# Patient Record
Sex: FEMALE | Race: WHITE | NOT HISPANIC OR LATINO | Employment: FULL TIME | URBAN - METROPOLITAN AREA
[De-identification: names, ages, dates, MRNs, and addresses within clinical notes are randomized per-mention and may not be internally consistent; named-entity substitution may affect disease eponyms.]

---

## 2019-05-14 ENCOUNTER — OFFICE VISIT (OUTPATIENT)
Dept: OBSTETRICS AND GYNECOLOGY | Facility: CLINIC | Age: 34
End: 2019-05-14
Attending: OBSTETRICS & GYNECOLOGY
Payer: COMMERCIAL

## 2019-05-14 VITALS
HEIGHT: 68 IN | BODY MASS INDEX: 25.07 KG/M2 | WEIGHT: 165.44 LBS | DIASTOLIC BLOOD PRESSURE: 80 MMHG | SYSTOLIC BLOOD PRESSURE: 112 MMHG

## 2019-05-14 DIAGNOSIS — Z12.4 ENCOUNTER FOR PAPANICOLAOU SMEAR FOR CERVICAL CANCER SCREENING: Primary | ICD-10-CM

## 2019-05-14 DIAGNOSIS — N93.8 DUB (DYSFUNCTIONAL UTERINE BLEEDING): ICD-10-CM

## 2019-05-14 DIAGNOSIS — Z11.51 ENCOUNTER FOR SCREENING FOR HUMAN PAPILLOMAVIRUS (HPV): ICD-10-CM

## 2019-05-14 DIAGNOSIS — Z13.21 ENCOUNTER FOR VITAMIN DEFICIENCY SCREENING: ICD-10-CM

## 2019-05-14 DIAGNOSIS — Z00.00 PERIODIC HEALTH ASSESSMENT, GENERAL SCREENING, ADULT: ICD-10-CM

## 2019-05-14 DIAGNOSIS — L68.0 HIRSUTISM: ICD-10-CM

## 2019-05-14 PROCEDURE — 87624 HPV HI-RISK TYP POOLED RSLT: CPT

## 2019-05-14 PROCEDURE — 99999 PR PBB SHADOW E&M-NEW PATIENT-LVL III: CPT | Mod: PBBFAC,,, | Performed by: OBSTETRICS & GYNECOLOGY

## 2019-05-14 PROCEDURE — 99999 PR PBB SHADOW E&M-NEW PATIENT-LVL III: ICD-10-PCS | Mod: PBBFAC,,, | Performed by: OBSTETRICS & GYNECOLOGY

## 2019-05-14 PROCEDURE — 88175 CYTOPATH C/V AUTO FLUID REDO: CPT

## 2019-05-14 PROCEDURE — 99385 PREV VISIT NEW AGE 18-39: CPT | Mod: S$GLB,,, | Performed by: OBSTETRICS & GYNECOLOGY

## 2019-05-14 PROCEDURE — 99385 PR PREVENTIVE VISIT,NEW,18-39: ICD-10-PCS | Mod: S$GLB,,, | Performed by: OBSTETRICS & GYNECOLOGY

## 2019-05-14 RX ORDER — FEXOFENADINE HCL 60 MG
60 TABLET ORAL DAILY
COMMUNITY
End: 2020-05-26

## 2019-05-14 NOTE — PROGRESS NOTES
Subjective:       Patient ID: Astrid Kaemmerling is a 34 y.o. female.    Chief Complaint:  Establish Care (trying to get pregnant for past 10 months , irregular periods had mirena, last pap maybe 2 years ago normal per pt')      History of Present Illness.  Astrid Kaemmerling is a 34 y.o. female.  She has no breast or urinary symptoms.  She has no menorrhagia, oligomenorrhea, bleeding betweeen menses, postcoital bleeding, dysmenorrhea, pelvic pain, dyspareunia, vaginal dryness, vaginal discharge, or sexual complaints.  Had Mirena taken out 2018.  Period -, 3/17-3/21, light spotting on .  Before Mirena, took OCP continuously.  No issues with acne or excess hair growth.  She has been trying for a year to conceive.    GYN & OB History  Patient's last menstrual period was 2019 (approximate).   Date of Last Pap: 2017 Normal per pt  Gardasil: No    OB History    Para Term  AB Living   0 0 0 0 0 0   SAB TAB Ectopic Multiple Live Births   0 0 0 0 0   Obstetric Comments   Menarche at 12       Past Medical History:   Diagnosis Date    Asthma      Past Surgical History:   Procedure Laterality Date    FOOT SURGERY       Family History   Problem Relation Age of Onset    Colon cancer Paternal Grandfather     Prostate cancer Paternal Grandfather         30's    Breast cancer Maternal Grandmother         70's    Stroke Maternal Grandmother     Heart attack Maternal Grandfather     Parkinsonism Paternal Grandmother     No Known Problems Father     No Known Problems Mother     No Known Problems Brother     No Known Problems Brother     Ovarian cancer Neg Hx      Social History     Tobacco Use    Smoking status: Never Smoker    Smokeless tobacco: Never Used   Substance Use Topics    Alcohol use: Yes     Comment: once a week    Drug use: Never       Current Outpatient Medications:     fexofenadine (ALLEGRA) 60 MG tablet, Take 60 mg by mouth once daily., Disp: , Rfl:     Review of  "patient's allergies indicates:   Allergen Reactions    Apple     Banana     Nuts [tree nut]        Review of Systems  Review of Systems   Constitutional: Negative for fatigue.   HENT: Negative for trouble swallowing.    Eyes: Negative for visual disturbance.   Respiratory: Negative for cough and shortness of breath.    Cardiovascular: Negative for chest pain.   Gastrointestinal: Negative for abdominal distention, abdominal pain, blood in stool, nausea and vomiting.   Genitourinary: Negative for difficulty urinating, dyspareunia, dysuria, flank pain, frequency, hematuria, pelvic pain, urgency, vaginal bleeding, vaginal discharge and vaginal pain.   Musculoskeletal: Negative for arthralgias.   Skin: Negative for rash.   Neurological: Negative for dizziness and headaches.   Psychiatric/Behavioral: Negative for sleep disturbance. The patient is not nervous/anxious.         Objective:     Vitals:    05/14/19 1123   BP: 112/80   Weight: 75 kg (165 lb 7.3 oz)   Height: 5' 7.72" (1.72 m)   PainSc: 0-No pain     Body mass index is 25.37 kg/m².      Physical Exam:   Constitutional: She is oriented to person, place, and time. Vital signs are normal. She appears well-developed and well-nourished.    HENT:   Head: Normocephalic.     Neck: Normal range of motion. No thyromegaly present.     Pulmonary/Chest: Right breast exhibits no mass, no nipple discharge, no skin change, no tenderness and no swelling. Left breast exhibits no mass, no nipple discharge, no skin change, no tenderness and no swelling. Breasts are symmetrical.       Hair middle of chest and around nipples        Abdominal: Soft. Normal appearance and bowel sounds are normal. She exhibits no distension. There is no tenderness.     Genitourinary: Vagina normal and uterus normal. Pelvic exam was performed with patient supine. There is no rash, tenderness, lesion or injury on the right labia. There is no rash, tenderness, lesion or injury on the left labia. Cervix " is normal. Right adnexum displays no mass, no tenderness and no fullness. Left adnexum displays no mass, no tenderness and no fullness. No erythema in the vagina. No vaginal discharge found. Cervix exhibits no motion tenderness and no discharge.           Musculoskeletal: Normal range of motion.      Lymphadenopathy:        Right: No inguinal and no supraclavicular adenopathy present.        Left: No inguinal and no supraclavicular adenopathy present.    Neurological: She is alert and oriented to person, place, and time.    Skin: Skin is warm and dry.    Psychiatric: She has a normal mood and affect.        Assessment/ Plan:     Encounter for Papanicolaou smear for cervical cancer screening  -     Liquid-based pap smear, screening    Encounter for screening for human papillomavirus (HPV)  -     HPV High Risk Genotypes, PCR    DUB (dysfunctional uterine bleeding)  -     Follicle stimulating hormone; Future  -     Luteinizing hormone; Future  -     TSH; Future  -     CBC auto differential; Future; Expected date: 05/14/2019  -     17-Hydroxyprogesterone; Future  -     Prolactin; Future  -     Testosterone, free; Future  -     Testosterone; Future  -     DHEA-sulfate; Future  -     US Pelvis Comp with Transvag NON-OB (xpd; Future; Expected date: 05/14/2019    Periodic health assessment, general screening, adult  -     CBC auto differential; Future; Expected date: 05/14/2019  -     Comprehensive metabolic panel; Future  -     Hemoglobin A1c; Future  -     Lipid panel; Future    Encounter for vitamin deficiency screening  -     Vitamin D; Future; Expected date: 05/14/2019      U/S for DUB - suspect PCOS  Routine pap smears.  Self breast exam  Diet and exercise discussed.  Follow-up with me in 2-3 weeks after labs.

## 2019-05-17 LAB
HPV HR 12 DNA CVX QL NAA+PROBE: NEGATIVE
HPV16 AG SPEC QL: NEGATIVE
HPV18 DNA SPEC QL NAA+PROBE: NEGATIVE

## 2019-05-23 ENCOUNTER — LAB VISIT (OUTPATIENT)
Dept: LAB | Facility: OTHER | Age: 34
End: 2019-05-23
Attending: OBSTETRICS & GYNECOLOGY
Payer: COMMERCIAL

## 2019-05-23 DIAGNOSIS — Z00.00 PERIODIC HEALTH ASSESSMENT, GENERAL SCREENING, ADULT: ICD-10-CM

## 2019-05-23 DIAGNOSIS — N93.8 DUB (DYSFUNCTIONAL UTERINE BLEEDING): ICD-10-CM

## 2019-05-23 DIAGNOSIS — Z13.21 ENCOUNTER FOR VITAMIN DEFICIENCY SCREENING: ICD-10-CM

## 2019-05-23 LAB
25(OH)D3+25(OH)D2 SERPL-MCNC: 19 NG/ML (ref 30–96)
ALBUMIN SERPL BCP-MCNC: 4.3 G/DL (ref 3.5–5.2)
ALP SERPL-CCNC: 37 U/L (ref 55–135)
ALT SERPL W/O P-5'-P-CCNC: 22 U/L (ref 10–44)
ANION GAP SERPL CALC-SCNC: 8 MMOL/L (ref 8–16)
AST SERPL-CCNC: 21 U/L (ref 10–40)
BASOPHILS # BLD AUTO: 0.02 K/UL (ref 0–0.2)
BASOPHILS NFR BLD: 0.6 % (ref 0–1.9)
BILIRUB SERPL-MCNC: 0.7 MG/DL (ref 0.1–1)
BUN SERPL-MCNC: 11 MG/DL (ref 6–20)
CALCIUM SERPL-MCNC: 9.2 MG/DL (ref 8.7–10.5)
CHLORIDE SERPL-SCNC: 106 MMOL/L (ref 95–110)
CHOLEST SERPL-MCNC: 193 MG/DL (ref 120–199)
CHOLEST/HDLC SERPL: 2.6 {RATIO} (ref 2–5)
CO2 SERPL-SCNC: 24 MMOL/L (ref 23–29)
CREAT SERPL-MCNC: 0.7 MG/DL (ref 0.5–1.4)
DHEA-S SERPL-MCNC: 156.5 UG/DL (ref 74.8–410.2)
DIFFERENTIAL METHOD: ABNORMAL
EOSINOPHIL # BLD AUTO: 0.2 K/UL (ref 0–0.5)
EOSINOPHIL NFR BLD: 5.3 % (ref 0–8)
ERYTHROCYTE [DISTWIDTH] IN BLOOD BY AUTOMATED COUNT: 12.4 % (ref 11.5–14.5)
EST. GFR  (AFRICAN AMERICAN): >60 ML/MIN/1.73 M^2
EST. GFR  (NON AFRICAN AMERICAN): >60 ML/MIN/1.73 M^2
ESTIMATED AVG GLUCOSE: 91 MG/DL (ref 68–131)
FSH SERPL-ACNC: 29.3 MIU/ML
GLUCOSE SERPL-MCNC: 79 MG/DL (ref 70–110)
HBA1C MFR BLD HPLC: 4.8 % (ref 4–5.6)
HCT VFR BLD AUTO: 37.4 % (ref 37–48.5)
HDLC SERPL-MCNC: 74 MG/DL (ref 40–75)
HDLC SERPL: 38.3 % (ref 20–50)
HGB BLD-MCNC: 12.9 G/DL (ref 12–16)
LDLC SERPL CALC-MCNC: 110 MG/DL (ref 63–159)
LH SERPL-ACNC: 17.8 MIU/ML
LYMPHOCYTES # BLD AUTO: 1.5 K/UL (ref 1–4.8)
LYMPHOCYTES NFR BLD: 41.6 % (ref 18–48)
MCH RBC QN AUTO: 30.6 PG (ref 27–31)
MCHC RBC AUTO-ENTMCNC: 34.5 G/DL (ref 32–36)
MCV RBC AUTO: 89 FL (ref 82–98)
MONOCYTES # BLD AUTO: 0.3 K/UL (ref 0.3–1)
MONOCYTES NFR BLD: 6.9 % (ref 4–15)
NEUTROPHILS # BLD AUTO: 1.7 K/UL (ref 1.8–7.7)
NEUTROPHILS NFR BLD: 45.6 % (ref 38–73)
NONHDLC SERPL-MCNC: 119 MG/DL
PLATELET # BLD AUTO: 214 K/UL (ref 150–350)
PMV BLD AUTO: 9.8 FL (ref 9.2–12.9)
POTASSIUM SERPL-SCNC: 4.1 MMOL/L (ref 3.5–5.1)
PROLACTIN SERPL IA-MCNC: 21.6 NG/ML (ref 5.2–26.5)
PROT SERPL-MCNC: 7.2 G/DL (ref 6–8.4)
RBC # BLD AUTO: 4.21 M/UL (ref 4–5.4)
SODIUM SERPL-SCNC: 138 MMOL/L (ref 136–145)
TESTOST SERPL-MCNC: 19 NG/DL (ref 5–73)
TRIGL SERPL-MCNC: 45 MG/DL (ref 30–150)
TSH SERPL DL<=0.005 MIU/L-ACNC: 1.15 UIU/ML (ref 0.4–4)
WBC # BLD AUTO: 3.61 K/UL (ref 3.9–12.7)

## 2019-05-23 PROCEDURE — 83498 ASY HYDROXYPROGESTERONE 17-D: CPT

## 2019-05-23 PROCEDURE — 82627 DEHYDROEPIANDROSTERONE: CPT

## 2019-05-23 PROCEDURE — 83002 ASSAY OF GONADOTROPIN (LH): CPT

## 2019-05-23 PROCEDURE — 83001 ASSAY OF GONADOTROPIN (FSH): CPT

## 2019-05-23 PROCEDURE — 83036 HEMOGLOBIN GLYCOSYLATED A1C: CPT

## 2019-05-23 PROCEDURE — 84443 ASSAY THYROID STIM HORMONE: CPT

## 2019-05-23 PROCEDURE — 80061 LIPID PANEL: CPT

## 2019-05-23 PROCEDURE — 84402 ASSAY OF FREE TESTOSTERONE: CPT

## 2019-05-23 PROCEDURE — 82306 VITAMIN D 25 HYDROXY: CPT

## 2019-05-23 PROCEDURE — 84146 ASSAY OF PROLACTIN: CPT

## 2019-05-23 PROCEDURE — 80053 COMPREHEN METABOLIC PANEL: CPT

## 2019-05-23 PROCEDURE — 85025 COMPLETE CBC W/AUTO DIFF WBC: CPT

## 2019-05-23 PROCEDURE — 36415 COLL VENOUS BLD VENIPUNCTURE: CPT

## 2019-05-23 PROCEDURE — 84403 ASSAY OF TOTAL TESTOSTERONE: CPT

## 2019-05-28 LAB — 17OHP SERPL-MCNC: 65 NG/DL (ref 35–413)

## 2019-05-29 LAB — TESTOST FREE SERPL-MCNC: 1.1 PG/ML

## 2019-07-08 ENCOUNTER — LAB VISIT (OUTPATIENT)
Dept: LAB | Facility: OTHER | Age: 34
End: 2019-07-08
Attending: OBSTETRICS & GYNECOLOGY
Payer: COMMERCIAL

## 2019-07-08 ENCOUNTER — OFFICE VISIT (OUTPATIENT)
Dept: OBSTETRICS AND GYNECOLOGY | Facility: CLINIC | Age: 34
End: 2019-07-08
Attending: OBSTETRICS & GYNECOLOGY
Payer: COMMERCIAL

## 2019-07-08 ENCOUNTER — PATIENT MESSAGE (OUTPATIENT)
Dept: OBSTETRICS AND GYNECOLOGY | Facility: CLINIC | Age: 34
End: 2019-07-08

## 2019-07-08 VITALS
SYSTOLIC BLOOD PRESSURE: 108 MMHG | WEIGHT: 164.13 LBS | HEIGHT: 68 IN | BODY MASS INDEX: 24.88 KG/M2 | DIASTOLIC BLOOD PRESSURE: 64 MMHG

## 2019-07-08 DIAGNOSIS — N93.8 DUB (DYSFUNCTIONAL UTERINE BLEEDING): Primary | ICD-10-CM

## 2019-07-08 DIAGNOSIS — N93.8 DUB (DYSFUNCTIONAL UTERINE BLEEDING): ICD-10-CM

## 2019-07-08 PROCEDURE — 99214 PR OFFICE/OUTPT VISIT, EST, LEVL IV, 30-39 MIN: ICD-10-PCS | Mod: S$GLB,,, | Performed by: OBSTETRICS & GYNECOLOGY

## 2019-07-08 PROCEDURE — 99999 PR PBB SHADOW E&M-EST. PATIENT-LVL III: ICD-10-PCS | Mod: PBBFAC,,, | Performed by: OBSTETRICS & GYNECOLOGY

## 2019-07-08 PROCEDURE — 83520 IMMUNOASSAY QUANT NOS NONAB: CPT

## 2019-07-08 PROCEDURE — 99214 OFFICE O/P EST MOD 30 MIN: CPT | Mod: S$GLB,,, | Performed by: OBSTETRICS & GYNECOLOGY

## 2019-07-08 PROCEDURE — 99999 PR PBB SHADOW E&M-EST. PATIENT-LVL III: CPT | Mod: PBBFAC,,, | Performed by: OBSTETRICS & GYNECOLOGY

## 2019-07-08 PROCEDURE — 3008F PR BODY MASS INDEX (BMI) DOCUMENTED: ICD-10-PCS | Mod: CPTII,S$GLB,, | Performed by: OBSTETRICS & GYNECOLOGY

## 2019-07-08 PROCEDURE — 3008F BODY MASS INDEX DOCD: CPT | Mod: CPTII,S$GLB,, | Performed by: OBSTETRICS & GYNECOLOGY

## 2019-07-08 PROCEDURE — 36415 COLL VENOUS BLD VENIPUNCTURE: CPT

## 2019-07-08 NOTE — PROGRESS NOTES
Astrid Kaemmerling is a 34 y.o. female who presents for follow up of DUB and trying to conceive.  Had Mirena taken out 4/2018.  Period 2/8-2/12, 3/17-3/21, light spotting on 5/8.  Before Mirena, took OCP continuously.  No issues with acne or excess hair growth.  She has been trying for a year to conceive.  On physical exam, she had hair on middle of chest and around the nipples.  Today on U/S, she does not PCOS appearing ovaries with an endometrial stripe of 10 mm.  Labs are basically normal except FSH is 29 and Vitamin D is 19.  Period ended 12/20 and then was 8 weeks until next period on 2/8-2/12.  Periods as follows:  March 17-21, May 2-8, June 1-5, and July 1-4.  Unsure when mother went through menopause.  No sleep issues.  Maybe occasional hot flashes.  2 cousins had issues conceiving possibly due to POF.  She thinks they both had to see DIANNA.    Lab Visit on 05/23/2019   Component Date Value Ref Range Status    Follicle Stimulating Hormone 05/23/2019 29.30  See Text mIU/mL Final    LH 05/23/2019 17.8  See Text mIU/mL Final    TSH 05/23/2019 1.147  0.400 - 4.000 uIU/mL Final    WBC 05/23/2019 3.61* 3.90 - 12.70 K/uL Final    RBC 05/23/2019 4.21  4.00 - 5.40 M/uL Final    Hemoglobin 05/23/2019 12.9  12.0 - 16.0 g/dL Final    Hematocrit 05/23/2019 37.4  37.0 - 48.5 % Final    Mean Corpuscular Volume 05/23/2019 89  82 - 98 fL Final    Mean Corpuscular Hemoglobin 05/23/2019 30.6  27.0 - 31.0 pg Final    Mean Corpuscular Hemoglobin Conc 05/23/2019 34.5  32.0 - 36.0 g/dL Final    RDW 05/23/2019 12.4  11.5 - 14.5 % Final    Platelets 05/23/2019 214  150 - 350 K/uL Final    MPV 05/23/2019 9.8  9.2 - 12.9 fL Final    Gran # (ANC) 05/23/2019 1.7* 1.8 - 7.7 K/uL Final    Lymph # 05/23/2019 1.5  1.0 - 4.8 K/uL Final    Mono # 05/23/2019 0.3  0.3 - 1.0 K/uL Final    Eos # 05/23/2019 0.2  0.0 - 0.5 K/uL Final    Baso # 05/23/2019 0.02  0.00 - 0.20 K/uL Final    Gran% 05/23/2019 45.6  38.0 - 73.0 % Final     Lymph% 05/23/2019 41.6  18.0 - 48.0 % Final    Mono% 05/23/2019 6.9  4.0 - 15.0 % Final    Eosinophil% 05/23/2019 5.3  0.0 - 8.0 % Final    Basophil% 05/23/2019 0.6  0.0 - 1.9 % Final    Differential Method 05/23/2019 Automated   Final    Sodium 05/23/2019 138  136 - 145 mmol/L Final    Potassium 05/23/2019 4.1  3.5 - 5.1 mmol/L Final    Chloride 05/23/2019 106  95 - 110 mmol/L Final    CO2 05/23/2019 24  23 - 29 mmol/L Final    Glucose 05/23/2019 79  70 - 110 mg/dL Final    BUN, Bld 05/23/2019 11  6 - 20 mg/dL Final    Creatinine 05/23/2019 0.7  0.5 - 1.4 mg/dL Final    Calcium 05/23/2019 9.2  8.7 - 10.5 mg/dL Final    Total Protein 05/23/2019 7.2  6.0 - 8.4 g/dL Final    Albumin 05/23/2019 4.3  3.5 - 5.2 g/dL Final    Total Bilirubin 05/23/2019 0.7  0.1 - 1.0 mg/dL Final    Alkaline Phosphatase 05/23/2019 37* 55 - 135 U/L Final    AST 05/23/2019 21  10 - 40 U/L Final    ALT 05/23/2019 22  10 - 44 U/L Final    Anion Gap 05/23/2019 8  8 - 16 mmol/L Final    eGFR if African American 05/23/2019 >60  >60 mL/min/1.73 m^2 Final    eGFR if non African American 05/23/2019 >60  >60 mL/min/1.73 m^2 Final    Hemoglobin A1C 05/23/2019 4.8  4.0 - 5.6 % Final    Estimated Avg Glucose 05/23/2019 91  68 - 131 mg/dL Final    Cholesterol 05/23/2019 193  120 - 199 mg/dL Final    Triglycerides 05/23/2019 45  30 - 150 mg/dL Final    HDL 05/23/2019 74  40 - 75 mg/dL Final    LDL Cholesterol 05/23/2019 110.0  63.0 - 159.0 mg/dL Final    Hdl/Cholesterol Ratio 05/23/2019 38.3  20.0 - 50.0 % Final    Total Cholesterol/HDL Ratio 05/23/2019 2.6  2.0 - 5.0 Final    Non-HDL Cholesterol 05/23/2019 119  mg/dL Final    17-Hydroxyprogesterone 05/23/2019 65  35 - 413 ng/dL Final    Prolactin 05/23/2019 21.6  5.2 - 26.5 ng/mL Final    Testosterone, Free 05/23/2019 1.1  pg/mL Final    Testosterone, Total 05/23/2019 19  5 - 73 ng/dL Final    DHEA-SO4 05/23/2019 156.5  74.8 - 410.2 ug/dL Final    Vit D, 25-Hydroxy  2019 19* 30 - 96 ng/mL Final   Office Visit on 2019   Component Date Value Ref Range Status    HPV High Risk type 16, PCR 2019 Negative  Negative Final    HPV High Risk type 18, PCR 2019 Negative  Negative Final    HPV other High Risk types, PCR 2019 Negative  Negative Final       Menstrual History:  OB History        0    Para   0    Term   0       0    AB   0    Living   0       SAB   0    TAB   0    Ectopic   0    Multiple   0    Live Births   0           Obstetric Comments   Menarche at 12            Patient's last menstrual period was 2019 (exact date).       Past Medical History:   Diagnosis Date    Asthma      Past Surgical History:   Procedure Laterality Date    FOOT SURGERY       Social History     Tobacco Use    Smoking status: Never Smoker    Smokeless tobacco: Never Used   Substance Use Topics    Alcohol use: Yes     Comment: once a week    Drug use: Never     Family History   Problem Relation Age of Onset    Colon cancer Paternal Grandfather     Prostate cancer Paternal Grandfather         30's    Breast cancer Maternal Grandmother         70's    Stroke Maternal Grandmother     Heart attack Maternal Grandfather     Parkinsonism Paternal Grandmother     No Known Problems Father     No Known Problems Mother     No Known Problems Brother     No Known Problems Brother     Ovarian cancer Neg Hx      OB History    Para Term  AB Living   0 0 0 0 0 0   SAB TAB Ectopic Multiple Live Births   0 0 0 0 0   Obstetric Comments   Menarche at 12       Review of Systems:  General: No fever, chills, fatigue or weight loss.  Chest: No chest pain, shortness of breath, or palpitations.  Breast: No pain, masses, or nipple discharge.  Vulva: No pain, lesions, or itching.  Vagina: No relaxation, pain, itching, discharge, or lesions.  Abdomen: No pain, nausea, vomiting, diarrhea, or constipation.  Urinary: No incontinence, nocturia,  "frequency, or dysuria.  Extremities:  No leg cramps, edema, or calf pain.  Neurologic: No headaches, dizziness, or visual changes.     Objective:     Vitals:    07/08/19 1120   BP: 108/64   Weight: 74.5 kg (164 lb 2.1 oz)   Height: 5' 7.72" (1.72 m)   PainSc: 0-No pain     Body mass index is 25.16 kg/m².    Assessment & Plan   DUB (dysfunctional uterine bleeding)  -     Antimullerian hormone (AMH); Future      Discussed with patient this could be POI or possibly PCOS - picture not completely clear although the FSH being 29 and her possible family h/o POI in 2 cousins who got pregant through assted reproduction makes me think more POI.  Continue nature made vitamin D3 5000 IU daily  Refer to Dr. Sheets or Lidia.  I spent 25 minutes face to face with the patient today.  Follow-up after seeing DIANNA.    "

## 2019-07-10 LAB — MIS SERPL-MCNC: <0.1 NG/ML (ref 0.9–9.5)

## 2019-08-23 ENCOUNTER — OFFICE VISIT (OUTPATIENT)
Dept: OBSTETRICS AND GYNECOLOGY | Facility: CLINIC | Age: 34
End: 2019-08-23
Payer: COMMERCIAL

## 2019-08-23 VITALS
HEIGHT: 68 IN | SYSTOLIC BLOOD PRESSURE: 120 MMHG | DIASTOLIC BLOOD PRESSURE: 80 MMHG | BODY MASS INDEX: 24.88 KG/M2 | WEIGHT: 164.13 LBS

## 2019-08-23 DIAGNOSIS — N92.6 MISSED MENSES: Primary | ICD-10-CM

## 2019-08-23 DIAGNOSIS — Z32.01 POSITIVE URINE PREGNANCY TEST: ICD-10-CM

## 2019-08-23 PROCEDURE — 99213 OFFICE O/P EST LOW 20 MIN: CPT | Mod: S$GLB,,, | Performed by: NURSE PRACTITIONER

## 2019-08-23 PROCEDURE — 99999 PR PBB SHADOW E&M-EST. PATIENT-LVL III: CPT | Mod: PBBFAC,,, | Performed by: NURSE PRACTITIONER

## 2019-08-23 PROCEDURE — 3008F BODY MASS INDEX DOCD: CPT | Mod: CPTII,S$GLB,, | Performed by: NURSE PRACTITIONER

## 2019-08-23 PROCEDURE — 99213 PR OFFICE/OUTPT VISIT, EST, LEVL III, 20-29 MIN: ICD-10-PCS | Mod: S$GLB,,, | Performed by: NURSE PRACTITIONER

## 2019-08-23 PROCEDURE — 99999 PR PBB SHADOW E&M-EST. PATIENT-LVL III: ICD-10-PCS | Mod: PBBFAC,,, | Performed by: NURSE PRACTITIONER

## 2019-08-23 PROCEDURE — 3008F PR BODY MASS INDEX (BMI) DOCUMENTED: ICD-10-PCS | Mod: CPTII,S$GLB,, | Performed by: NURSE PRACTITIONER

## 2019-08-23 NOTE — PATIENT INSTRUCTIONS
Nausea and vomiting in pregnancy:    -  Education regarding lifestyle and dietary modifications    -  Advised use of B6/Unisom. Pt will notify us if no relief/worsening symptoms, will consider Zofran if needed.  (To help with nausea and vomiting, 25mg of Vitamin B6 taken 3-4 times a day along with 12.5 mg of Unisom taken 3-4 times a day helps. Unisom only comes in 25mg tabs, so you will have to cut those in half. These are the same ingredients that are in the prescription versions!  Anything tamanna will help, along with small frequent meals instead of larger less frequent meals help. Stay hydrated.)

## 2019-08-23 NOTE — PROGRESS NOTES
"Chief Complaint: Absence of Menses     (Dr. Gandhi patient)    Patient's last menstrual period was 2019 (exact date).,   EDC: 2020,   GA:  7w 4d,  based upon LMP.      Astrid Kaemmerling is a 34 y.o. female  presents with complaint of absence of menstruation and (+) home UPT on 19.  States her menstrual cycles have been irregular, sometimes even skipping months.  She had appt set to see fertility specialist, and found out the day before with home UPT that she was pregnant. She reports nausea; denies vomiting.  Denies vaginal bleeding or abdominal pain.    UPT is: positive.     Last Pap:  2019     Past Medical History:   Diagnosis Date    Asthma      Past Surgical History:   Procedure Laterality Date    FOOT SURGERY       Social History     Tobacco Use    Smoking status: Never Smoker    Smokeless tobacco: Never Used   Substance Use Topics    Alcohol use: Yes     Comment: once a week    Drug use: Never     Family History   Problem Relation Age of Onset    Colon cancer Paternal Grandfather     Prostate cancer Paternal Grandfather         30's    Breast cancer Maternal Grandmother         70's    Stroke Maternal Grandmother     Heart attack Maternal Grandfather     Parkinsonism Paternal Grandmother     No Known Problems Father     No Known Problems Mother     No Known Problems Brother     No Known Problems Brother     Ovarian cancer Neg Hx      OB History    Para Term  AB Living   0 0 0 0 0 0   SAB TAB Ectopic Multiple Live Births   0 0 0 0 0   Obstetric Comments   Menarche at 12       /80   Ht 5' 8" (1.727 m)   Wt 74.5 kg (164 lb 2.1 oz)   LMP 2019 (Exact Date)   BMI 24.96 kg/m²   Body mass index is 24.96 kg/m².     ROS:  GENERAL: No weight changes. No swelling. No fatigue. No fever.  CARDIOVASCULAR: No chest pain. No shortness of breath. No leg cramps.   NEUROLOGICAL: No headaches. No vision changes.  BREASTS: No pain. No lumps. No " discharge.  ABDOMEN: No pain. No diarrhea. No constipation.  REPRODUCTIVE: No abnormal bleeding.   VULVA: No pain. No lesions. No itching.  VAGINA: No relaxation. No itching. No odor. No discharge. No lesions.  URINARY: No incontinence. No nocturia. No frequency. No dysuria.    MEDICATIONS AND ALLERGIES:  Reviewed     PE:   APPEARANCE: Well nourished, well developed, in no acute distress.  AFFECT: WNL, alert and oriented x 3.  NECK: Neck symmetric without masses or thyromegaly.   CHEST: Good respiratory effort.     Nausea and vomiting in pregnancy:    -  Education regarding lifestyle and dietary modifications    -  Advised use of B6/Unisom. Pt will notify us if no relief/worsening symptoms, will consider Zofran if needed.  (To help with nausea and vomiting, 25mg of Vitamin B6 taken 3-4 times a day along with 12.5 mg of Unisom taken 3-4 times a day helps. Unisom only comes in 25mg tabs, so you will have to cut those in half. These are the same ingredients that are in the prescription versions!  Anything tamanna will help, along with small frequent meals instead of larger less frequent meals help. Stay hydrated.)     1st TRIMESTER COUNSELING: Discussed and packet provided:  * Common complaints of pregnancy  * HIV and other routine prenatal tests including  genetic screening  * Risk factors identified by prenatal history;  Nutrition and weight gain counseling  * Oriented to practice: discussed anticipated course of prenatal care  * Indications for Ultrasound  * Childbirth classes/Hospital facilities   * Toxoplasmosis precautions (Cats/Raw Meat);  Foods to avoid in pregnancy (i.e. sushi, fish high in mercury, deli meat, and unpasteurized cheeses)  * Sexual activity and exercise; Caffeine consumption (<300 mg/day)  * Environmental/Work hazards; Travel (including Zika Precautions)  * Tobacco, alcohol, and drug use  * Use of any medications (Including supplements, Vitamins, Herbs, or OTC Drugs)  * Domestic  violence; Seat belt use & not texting while driving    *  Connected Moms-- to be discussed per MD at Initial OB visit.    ASSESSMENT and PLAN:  Missed menses  -     POCT urine pregnancy  -     US OB/GYN Procedure ([facility]) - Extended List - Future; Future    Positive urine pregnancy test        Follow up in about 1 week (around 8/30/2019) for F/U with physician for Initial OB visit & U/S.    ~25 minutes spent with pt Face to Face with >50% of visit spent on education/counseling.

## 2019-08-28 ENCOUNTER — PROCEDURE VISIT (OUTPATIENT)
Dept: OBSTETRICS AND GYNECOLOGY | Facility: CLINIC | Age: 34
End: 2019-08-28
Payer: COMMERCIAL

## 2019-08-28 ENCOUNTER — LAB VISIT (OUTPATIENT)
Dept: LAB | Facility: HOSPITAL | Age: 34
End: 2019-08-28
Attending: OBSTETRICS & GYNECOLOGY
Payer: COMMERCIAL

## 2019-08-28 ENCOUNTER — INITIAL PRENATAL (OUTPATIENT)
Dept: OBSTETRICS AND GYNECOLOGY | Facility: CLINIC | Age: 34
End: 2019-08-28
Payer: COMMERCIAL

## 2019-08-28 VITALS
DIASTOLIC BLOOD PRESSURE: 60 MMHG | WEIGHT: 162.25 LBS | SYSTOLIC BLOOD PRESSURE: 108 MMHG | BODY MASS INDEX: 24.67 KG/M2

## 2019-08-28 DIAGNOSIS — N92.6 MISSED MENSES: ICD-10-CM

## 2019-08-28 DIAGNOSIS — Z34.90 PREGNANCY, UNSPECIFIED GESTATIONAL AGE: ICD-10-CM

## 2019-08-28 DIAGNOSIS — Z34.90 PREGNANCY, UNSPECIFIED GESTATIONAL AGE: Primary | ICD-10-CM

## 2019-08-28 DIAGNOSIS — Z36.89 ESTABLISH GESTATIONAL AGE, ULTRASOUND: ICD-10-CM

## 2019-08-28 DIAGNOSIS — O36.80X0 ENCOUNTER TO DETERMINE FETAL VIABILITY OF PREGNANCY, SINGLE OR UNSPECIFIED FETUS: ICD-10-CM

## 2019-08-28 LAB
BASOPHILS # BLD AUTO: 0.05 K/UL (ref 0–0.2)
BASOPHILS NFR BLD: 0.6 % (ref 0–1.9)
DIFFERENTIAL METHOD: ABNORMAL
EOSINOPHIL # BLD AUTO: 0.3 K/UL (ref 0–0.5)
EOSINOPHIL NFR BLD: 3.8 % (ref 0–8)
ERYTHROCYTE [DISTWIDTH] IN BLOOD BY AUTOMATED COUNT: 11.9 % (ref 11.5–14.5)
GLUCOSE SERPL-MCNC: 61 MG/DL (ref 70–110)
HCT VFR BLD AUTO: 36 % (ref 37–48.5)
HGB BLD-MCNC: 12.3 G/DL (ref 12–16)
IMM GRANULOCYTES # BLD AUTO: 0.02 K/UL (ref 0–0.04)
IMM GRANULOCYTES NFR BLD AUTO: 0.2 % (ref 0–0.5)
LYMPHOCYTES # BLD AUTO: 1.8 K/UL (ref 1–4.8)
LYMPHOCYTES NFR BLD: 21.2 % (ref 18–48)
MCH RBC QN AUTO: 31.1 PG (ref 27–31)
MCHC RBC AUTO-ENTMCNC: 34.2 G/DL (ref 32–36)
MCV RBC AUTO: 91 FL (ref 82–98)
MONOCYTES # BLD AUTO: 0.5 K/UL (ref 0.3–1)
MONOCYTES NFR BLD: 6.3 % (ref 4–15)
NEUTROPHILS # BLD AUTO: 5.8 K/UL (ref 1.8–7.7)
NEUTROPHILS NFR BLD: 67.9 % (ref 38–73)
NRBC BLD-RTO: 0 /100 WBC
PLATELET # BLD AUTO: 225 K/UL (ref 150–350)
PMV BLD AUTO: 9.7 FL (ref 9.2–12.9)
RBC # BLD AUTO: 3.96 M/UL (ref 4–5.4)
TSH SERPL DL<=0.005 MIU/L-ACNC: 1.31 UIU/ML (ref 0.4–4)
WBC # BLD AUTO: 8.46 K/UL (ref 3.9–12.7)

## 2019-08-28 PROCEDURE — 99999 PR PBB SHADOW E&M-EST. PATIENT-LVL II: CPT | Mod: PBBFAC,,, | Performed by: OBSTETRICS & GYNECOLOGY

## 2019-08-28 PROCEDURE — 86592 SYPHILIS TEST NON-TREP QUAL: CPT

## 2019-08-28 PROCEDURE — 0502F SUBSEQUENT PRENATAL CARE: CPT | Mod: CPTII,S$GLB,, | Performed by: OBSTETRICS & GYNECOLOGY

## 2019-08-28 PROCEDURE — 87086 URINE CULTURE/COLONY COUNT: CPT

## 2019-08-28 PROCEDURE — 86762 RUBELLA ANTIBODY: CPT

## 2019-08-28 PROCEDURE — 87491 CHLMYD TRACH DNA AMP PROBE: CPT

## 2019-08-28 PROCEDURE — 76801 OB US < 14 WKS SINGLE FETUS: CPT | Mod: S$GLB,,, | Performed by: OBSTETRICS & GYNECOLOGY

## 2019-08-28 PROCEDURE — 84144 ASSAY OF PROGESTERONE: CPT

## 2019-08-28 PROCEDURE — 99999 PR PBB SHADOW E&M-EST. PATIENT-LVL II: ICD-10-PCS | Mod: PBBFAC,,, | Performed by: OBSTETRICS & GYNECOLOGY

## 2019-08-28 PROCEDURE — 85025 COMPLETE CBC W/AUTO DIFF WBC: CPT

## 2019-08-28 PROCEDURE — 82947 ASSAY GLUCOSE BLOOD QUANT: CPT

## 2019-08-28 PROCEDURE — 76801 PR US, OB <14WKS, TRANSABD, SINGLE GESTATION: ICD-10-PCS | Mod: S$GLB,,, | Performed by: OBSTETRICS & GYNECOLOGY

## 2019-08-28 PROCEDURE — 86901 BLOOD TYPING SEROLOGIC RH(D): CPT

## 2019-08-28 PROCEDURE — 87340 HEPATITIS B SURFACE AG IA: CPT

## 2019-08-28 PROCEDURE — 86703 HIV-1/HIV-2 1 RESULT ANTBDY: CPT

## 2019-08-28 PROCEDURE — 84443 ASSAY THYROID STIM HORMONE: CPT

## 2019-08-28 PROCEDURE — 0502F PR SUBSEQUENT PRENATAL CARE: ICD-10-PCS | Mod: CPTII,S$GLB,, | Performed by: OBSTETRICS & GYNECOLOGY

## 2019-08-28 NOTE — PROGRESS NOTES
Obstetrical ultrasound completed today.  See report in imaging section of [facility].

## 2019-08-29 DIAGNOSIS — O98.911: Primary | ICD-10-CM

## 2019-08-29 DIAGNOSIS — R79.89 LOW SERUM PROGESTERONE: ICD-10-CM

## 2019-08-29 LAB
ABO + RH BLD: NORMAL
BACTERIA UR CULT: NORMAL
C TRACH DNA SPEC QL NAA+PROBE: NOT DETECTED
HBV SURFACE AG SERPL QL IA: NEGATIVE
HIV 1+2 AB+HIV1 P24 AG SERPL QL IA: NEGATIVE
N GONORRHOEA DNA SPEC QL NAA+PROBE: NOT DETECTED
PROGEST SERPL-MCNC: 14.2 NG/ML
RPR SER QL: NORMAL
RUBV IGG SER-ACNC: 68.8 IU/ML
RUBV IGG SER-IMP: REACTIVE

## 2019-08-29 RX ORDER — PROGESTERONE 100 MG/1
100 CAPSULE ORAL DAILY
Qty: 30 CAPSULE | Refills: 1 | Status: SHIPPED | OUTPATIENT
Start: 2019-08-29 | End: 2020-01-02

## 2019-09-05 ENCOUNTER — PATIENT MESSAGE (OUTPATIENT)
Dept: OBSTETRICS AND GYNECOLOGY | Facility: CLINIC | Age: 34
End: 2019-09-05

## 2019-09-05 NOTE — TELEPHONE ENCOUNTER
Swing 7w2d ob pt c/o nausea and vomiting. Advised pt to stay hydrated and drink 10-20 8oz glasses per day and to eat bland food. Asked pt if she would like a prescription sent in to help. Her reply came in two parts both are below:      Thank you for your fast reply.     I am not taking anything against nausea/vomiting but can say that I am definitely not hitting 10-12 8 oz glasses per day.     I think it's max. 6-8 and not all actually makes it into the system due to vomiting. I feel dehydrated and at a loss.     I would appreciate a prescription of pregnancy safe medication. I thought it would get better. Thank you.     Sarah   addition: I am also having a very hard time taking all the supplements particularly the prenatal vitamins, they make me vomit.

## 2019-09-06 RX ORDER — ONDANSETRON 4 MG/1
4 TABLET, FILM COATED ORAL EVERY 6 HOURS PRN
Qty: 30 TABLET | Refills: 0 | Status: SHIPPED | OUTPATIENT
Start: 2019-09-06 | End: 2020-04-07

## 2019-09-09 ENCOUNTER — PATIENT MESSAGE (OUTPATIENT)
Dept: OBSTETRICS AND GYNECOLOGY | Facility: CLINIC | Age: 34
End: 2019-09-09

## 2019-09-11 ENCOUNTER — PROCEDURE VISIT (OUTPATIENT)
Dept: OBSTETRICS AND GYNECOLOGY | Facility: CLINIC | Age: 34
End: 2019-09-11
Payer: COMMERCIAL

## 2019-09-11 ENCOUNTER — ROUTINE PRENATAL (OUTPATIENT)
Dept: OBSTETRICS AND GYNECOLOGY | Facility: CLINIC | Age: 34
End: 2019-09-11
Payer: COMMERCIAL

## 2019-09-11 VITALS — SYSTOLIC BLOOD PRESSURE: 102 MMHG | DIASTOLIC BLOOD PRESSURE: 62 MMHG | BODY MASS INDEX: 24.6 KG/M2 | WEIGHT: 161.81 LBS

## 2019-09-11 DIAGNOSIS — O09.511 AMA (ADVANCED MATERNAL AGE) PRIMIGRAVIDA 35+, FIRST TRIMESTER: ICD-10-CM

## 2019-09-11 DIAGNOSIS — Z36.89 ESTABLISH GESTATIONAL AGE, ULTRASOUND: ICD-10-CM

## 2019-09-11 DIAGNOSIS — Z3A.08 8 WEEKS GESTATION OF PREGNANCY: Primary | ICD-10-CM

## 2019-09-11 DIAGNOSIS — Z34.90 PREGNANCY, UNSPECIFIED GESTATIONAL AGE: ICD-10-CM

## 2019-09-11 PROCEDURE — 99999 PR PBB SHADOW E&M-EST. PATIENT-LVL III: CPT | Mod: PBBFAC,,, | Performed by: NURSE PRACTITIONER

## 2019-09-11 PROCEDURE — 0502F SUBSEQUENT PRENATAL CARE: CPT | Mod: CPTII,S$GLB,, | Performed by: NURSE PRACTITIONER

## 2019-09-11 PROCEDURE — 99999 PR PBB SHADOW E&M-EST. PATIENT-LVL III: ICD-10-PCS | Mod: PBBFAC,,, | Performed by: NURSE PRACTITIONER

## 2019-09-11 PROCEDURE — 76801 OB US < 14 WKS SINGLE FETUS: CPT | Mod: S$GLB,,, | Performed by: OBSTETRICS & GYNECOLOGY

## 2019-09-11 PROCEDURE — 0502F PR SUBSEQUENT PRENATAL CARE: ICD-10-PCS | Mod: CPTII,S$GLB,, | Performed by: NURSE PRACTITIONER

## 2019-09-11 PROCEDURE — 76801 PR US, OB <14WKS, TRANSABD, SINGLE GESTATION: ICD-10-PCS | Mod: S$GLB,,, | Performed by: OBSTETRICS & GYNECOLOGY

## 2019-09-11 NOTE — PROGRESS NOTES
Obstetrical ultrasound completed today.  See report in imaging section of [facility].

## 2019-09-11 NOTE — PROGRESS NOTES
"Here today for follow-up ultrasound from initial OB u/s to confirm dating and GA.  No change in GA/EDC.    EDC: 04/21/2020  GA:  8w 1d  Reviewed findings with pt and :  "A flaherty living IUP is present. Fetal size is consistent with established dating."  Bleeding/cramping prec given.  Patient will call [employer] to get the needed auth for [facility], as they told pt they will cover the test.  She would like both [facility] and [facility] done.  "

## 2019-09-18 ENCOUNTER — TELEPHONE (OUTPATIENT)
Dept: OBSTETRICS AND GYNECOLOGY | Facility: CLINIC | Age: 34
End: 2019-09-18

## 2019-09-18 NOTE — TELEPHONE ENCOUNTER
Dr. Gandhi pt called saying that she would like to talk about the M21 test and she needs an authorization. Please advise,

## 2019-09-18 NOTE — TELEPHONE ENCOUNTER
Spoke with [id number] and they informed me that pt will do test first and then if PA is needed they will send me the information to finalize it. No PA is needed in advance. Informed pt of this. She will do [id number] and Inheritest at her next visit.

## 2019-10-07 ENCOUNTER — LAB VISIT (OUTPATIENT)
Dept: LAB | Facility: OTHER | Age: 34
End: 2019-10-07
Attending: OBSTETRICS & GYNECOLOGY
Payer: COMMERCIAL

## 2019-10-07 ENCOUNTER — ROUTINE PRENATAL (OUTPATIENT)
Dept: OBSTETRICS AND GYNECOLOGY | Facility: CLINIC | Age: 34
End: 2019-10-07
Attending: OBSTETRICS & GYNECOLOGY
Payer: COMMERCIAL

## 2019-10-07 VITALS
DIASTOLIC BLOOD PRESSURE: 72 MMHG | SYSTOLIC BLOOD PRESSURE: 114 MMHG | WEIGHT: 166.56 LBS | BODY MASS INDEX: 25.32 KG/M2

## 2019-10-07 DIAGNOSIS — Z3A.11 11 WEEKS GESTATION OF PREGNANCY: ICD-10-CM

## 2019-10-07 DIAGNOSIS — Z3A.11 11 WEEKS GESTATION OF PREGNANCY: Primary | ICD-10-CM

## 2019-10-07 DIAGNOSIS — E55.9 VITAMIN D DEFICIENCY: ICD-10-CM

## 2019-10-07 DIAGNOSIS — Z34.01 ENCOUNTER FOR SUPERVISION OF NORMAL FIRST PREGNANCY IN FIRST TRIMESTER: ICD-10-CM

## 2019-10-07 LAB — 25(OH)D3+25(OH)D2 SERPL-MCNC: 42 NG/ML (ref 30–96)

## 2019-10-07 PROCEDURE — 99999 PR PBB SHADOW E&M-EST. PATIENT-LVL II: ICD-10-PCS | Mod: PBBFAC,,, | Performed by: OBSTETRICS & GYNECOLOGY

## 2019-10-07 PROCEDURE — 36415 COLL VENOUS BLD VENIPUNCTURE: CPT

## 2019-10-07 PROCEDURE — 82306 VITAMIN D 25 HYDROXY: CPT

## 2019-10-07 PROCEDURE — 0502F SUBSEQUENT PRENATAL CARE: CPT | Mod: CPTII,S$GLB,, | Performed by: OBSTETRICS & GYNECOLOGY

## 2019-10-07 PROCEDURE — 0502F PR SUBSEQUENT PRENATAL CARE: ICD-10-PCS | Mod: CPTII,S$GLB,, | Performed by: OBSTETRICS & GYNECOLOGY

## 2019-10-07 PROCEDURE — 99999 PR PBB SHADOW E&M-EST. PATIENT-LVL II: CPT | Mod: PBBFAC,,, | Performed by: OBSTETRICS & GYNECOLOGY

## 2019-10-07 NOTE — PROGRESS NOTES
Vomits daily.  PNV makes her nauseated.  I sent zofran but she doesn't like it due to drowsiness.  Declines Diclegis

## 2019-10-16 ENCOUNTER — PATIENT MESSAGE (OUTPATIENT)
Dept: OBSTETRICS AND GYNECOLOGY | Facility: CLINIC | Age: 34
End: 2019-10-16

## 2019-11-01 ENCOUNTER — TELEPHONE (OUTPATIENT)
Dept: OBSTETRICS AND GYNECOLOGY | Facility: CLINIC | Age: 34
End: 2019-11-01

## 2019-11-01 DIAGNOSIS — O09.511 AMA (ADVANCED MATERNAL AGE) PRIMIGRAVIDA 35+, FIRST TRIMESTER: Primary | ICD-10-CM

## 2019-11-01 NOTE — TELEPHONE ENCOUNTER
----- Message from Akosua Gandhi MD sent at 10/30/2019  4:05 PM CDT -----  Please schedule [facility] consult for this patient ASAP  ----- Message -----  From: Jeanie Fuentes MD  Sent: 10/29/2019   5:29 PM CDT  To: Akosua Gandhi MD    Yes, I would be more than happy to see her.  And thank you for your foresight on the FOB.    Jeanie    ----- Message -----  From: Akosua Gandhi MD  Sent: 10/29/2019   2:42 PM CDT  To: Jeanie Fuentes MD    Hi - This patient has premature ovarian insufficiency and is now 15 weeks pregnant.  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  Can I send her to you for a consult?  Also, I will order testing on .

## 2019-11-04 ENCOUNTER — TELEPHONE (OUTPATIENT)
Dept: MATERNAL FETAL MEDICINE | Facility: CLINIC | Age: 34
End: 2019-11-04

## 2019-11-04 NOTE — TELEPHONE ENCOUNTER
Message left for pt to call [facility] clinic at [phone] to schedule consult appointment.

## 2019-11-06 ENCOUNTER — ROUTINE PRENATAL (OUTPATIENT)
Dept: OBSTETRICS AND GYNECOLOGY | Facility: CLINIC | Age: 34
End: 2019-11-06
Payer: COMMERCIAL

## 2019-11-06 ENCOUNTER — PATIENT MESSAGE (OUTPATIENT)
Dept: OBSTETRICS AND GYNECOLOGY | Facility: CLINIC | Age: 34
End: 2019-11-06

## 2019-11-06 VITALS
SYSTOLIC BLOOD PRESSURE: 120 MMHG | WEIGHT: 169.06 LBS | DIASTOLIC BLOOD PRESSURE: 80 MMHG | BODY MASS INDEX: 25.71 KG/M2

## 2019-11-06 DIAGNOSIS — Z3A.16 16 WEEKS GESTATION OF PREGNANCY: Primary | ICD-10-CM

## 2019-11-06 DIAGNOSIS — Z34.02 ENCOUNTER FOR SUPERVISION OF NORMAL FIRST PREGNANCY IN SECOND TRIMESTER: ICD-10-CM

## 2019-11-06 PROCEDURE — 0502F PR SUBSEQUENT PRENATAL CARE: ICD-10-PCS | Mod: CPTII,S$GLB,, | Performed by: OBSTETRICS & GYNECOLOGY

## 2019-11-06 PROCEDURE — 0502F SUBSEQUENT PRENATAL CARE: CPT | Mod: CPTII,S$GLB,, | Performed by: OBSTETRICS & GYNECOLOGY

## 2019-11-06 PROCEDURE — 99999 PR PBB SHADOW E&M-EST. PATIENT-LVL II: CPT | Mod: PBBFAC,,, | Performed by: OBSTETRICS & GYNECOLOGY

## 2019-11-06 PROCEDURE — 99999 PR PBB SHADOW E&M-EST. PATIENT-LVL II: ICD-10-PCS | Mod: PBBFAC,,, | Performed by: OBSTETRICS & GYNECOLOGY

## 2019-11-08 ENCOUNTER — INITIAL CONSULT (OUTPATIENT)
Dept: MATERNAL FETAL MEDICINE | Facility: CLINIC | Age: 34
End: 2019-11-08
Payer: COMMERCIAL

## 2019-11-08 VITALS — BODY MASS INDEX: 25.91 KG/M2 | SYSTOLIC BLOOD PRESSURE: 98 MMHG | WEIGHT: 170.44 LBS | DIASTOLIC BLOOD PRESSURE: 50 MMHG

## 2019-11-08 DIAGNOSIS — Z14.8 GENETIC CARRIER: Primary | ICD-10-CM

## 2019-11-08 PROCEDURE — 3008F PR BODY MASS INDEX (BMI) DOCUMENTED: ICD-10-PCS | Mod: CPTII,S$GLB,, | Performed by: PEDIATRICS

## 2019-11-08 PROCEDURE — 99205 PR OFFICE/OUTPT VISIT, NEW, LEVL V, 60-74 MIN: ICD-10-PCS | Mod: S$GLB,,, | Performed by: PEDIATRICS

## 2019-11-08 PROCEDURE — 99205 OFFICE O/P NEW HI 60 MIN: CPT | Mod: S$GLB,,, | Performed by: PEDIATRICS

## 2019-11-08 PROCEDURE — 99999 PR PBB SHADOW E&M-EST. PATIENT-LVL III: CPT | Mod: PBBFAC,,, | Performed by: PEDIATRICS

## 2019-11-08 PROCEDURE — 99999 PR PBB SHADOW E&M-EST. PATIENT-LVL III: ICD-10-PCS | Mod: PBBFAC,,, | Performed by: PEDIATRICS

## 2019-11-08 PROCEDURE — 3008F BODY MASS INDEX DOCD: CPT | Mod: CPTII,S$GLB,, | Performed by: PEDIATRICS

## 2019-11-08 NOTE — LETTER
November 14, 2019      Akosua Gandhi MD  [address]  [address]  [facility] [address]           [facility] [address]  [address] [facility] [address]  Phone: [phone]          Patient: Astrid Kaemmerling   MR Number: [id number]   YOB: 1985   Date of Visit: 11/8/2019       Dear Dr. Akosua Gandhi:    Thank you for referring Astrid Kaemmerling to me for evaluation. Attached you will find relevant portions of my assessment and plan of care.    If you have questions, please do not hesitate to call me. I look forward to following Astrid Kaemmerling along with you.    Sincerely,    Jeanie Fuentes MD    Enclosure  CC:  No Recipients    If you would like to receive this communication electronically, please contact [email or web address] or [phone] to request more information on [employer] access.    For providers and/or their staff who would like to refer a patient to [facility], please contact us through our one-stop-shop provider referral line, [facility], at [phone].    If you feel you have received this communication in error or would no longer like to receive these types of communications, please e-mail [email or web address]

## 2019-11-11 ENCOUNTER — LAB VISIT (OUTPATIENT)
Dept: LAB | Facility: OTHER | Age: 34
End: 2019-11-11
Attending: PEDIATRICS
Payer: COMMERCIAL

## 2019-11-11 ENCOUNTER — INITIAL CONSULT (OUTPATIENT)
Dept: MATERNAL FETAL MEDICINE | Facility: CLINIC | Age: 34
End: 2019-11-11
Payer: COMMERCIAL

## 2019-11-11 ENCOUNTER — TELEPHONE (OUTPATIENT)
Dept: MATERNAL FETAL MEDICINE | Facility: CLINIC | Age: 34
End: 2019-11-11

## 2019-11-11 ENCOUNTER — PROCEDURE VISIT (OUTPATIENT)
Dept: MATERNAL FETAL MEDICINE | Facility: CLINIC | Age: 34
End: 2019-11-11
Payer: COMMERCIAL

## 2019-11-11 VITALS
WEIGHT: 172.38 LBS | BODY MASS INDEX: 26.21 KG/M2 | DIASTOLIC BLOOD PRESSURE: 76 MMHG | SYSTOLIC BLOOD PRESSURE: 112 MMHG

## 2019-11-11 DIAGNOSIS — Z14.8 TAY-SACHS CARRIER: ICD-10-CM

## 2019-11-11 DIAGNOSIS — Z13.79 ENCOUNTER FOR GENETIC SCREENING: Primary | ICD-10-CM

## 2019-11-11 DIAGNOSIS — Z14.8 GENETIC CARRIER: ICD-10-CM

## 2019-11-11 DIAGNOSIS — Z14.8 CARRIER OF FRAGILE X CHROMOSOME: ICD-10-CM

## 2019-11-11 LAB — MISCELLANEOUS TEST NAME: NORMAL

## 2019-11-11 PROCEDURE — 3008F PR BODY MASS INDEX (BMI) DOCUMENTED: ICD-10-PCS | Mod: CPTII,S$GLB,, | Performed by: PEDIATRICS

## 2019-11-11 PROCEDURE — 59000 PR AMNIOCENTESIS,DIAGNOSTIC: ICD-10-PCS | Mod: S$GLB,,, | Performed by: PEDIATRICS

## 2019-11-11 PROCEDURE — 3008F BODY MASS INDEX DOCD: CPT | Mod: CPTII,S$GLB,, | Performed by: PEDIATRICS

## 2019-11-11 PROCEDURE — 36415 COLL VENOUS BLD VENIPUNCTURE: CPT

## 2019-11-11 PROCEDURE — 76946 ECHO GUIDE FOR AMNIOCENTESIS: CPT | Mod: S$GLB,,, | Performed by: PEDIATRICS

## 2019-11-11 PROCEDURE — 99999 PR PBB SHADOW E&M-EST. PATIENT-LVL III: ICD-10-PCS | Mod: PBBFAC,,, | Performed by: PEDIATRICS

## 2019-11-11 PROCEDURE — 59000 AMNIOCENTESIS DIAGNOSTIC: CPT | Mod: S$GLB,,, | Performed by: PEDIATRICS

## 2019-11-11 PROCEDURE — 99999 PR PBB SHADOW E&M-EST. PATIENT-LVL III: CPT | Mod: PBBFAC,,, | Performed by: PEDIATRICS

## 2019-11-11 PROCEDURE — 76946 PR  SO2 GUIDE AMNIOCENTESIS: ICD-10-PCS | Mod: S$GLB,,, | Performed by: PEDIATRICS

## 2019-11-11 PROCEDURE — 99213 OFFICE O/P EST LOW 20 MIN: CPT | Mod: 25,S$GLB,, | Performed by: PEDIATRICS

## 2019-11-11 PROCEDURE — 99213 PR OFFICE/OUTPT VISIT, EST, LEVL III, 20-29 MIN: ICD-10-PCS | Mod: 25,S$GLB,, | Performed by: PEDIATRICS

## 2019-11-11 NOTE — TELEPHONE ENCOUNTER
Message left for genetic counselor to call [facility] clinic at [phone].     9:43 am= Yue Burnham, genetic counselor with [employer] returning call. Informed her that Amniocentesis was done this am on pt. Per Dr Fuentes 23 mls was collected. She wants to ensure the PRIORITY lab is still Fragile X as reported in the previous phone call on Friday. Additional labs ordered are Chromosome Analysis and [facility] Full sequencing. If enough of sample collected is available without interfering with the priority testing, [facility] (FISH) can be done as ordered. Yue reports based on the volume of 23 mls she will not be able to do a direct on fragile X but it can be cultured. FISH will be able to be processed because it requires such a tiny amount amount of sample to process. Yue wanted Dr Fuentes to NOT worry when Chromosome Analysis reports first. She can expected the Fragile X result in about 3 weeks.

## 2019-11-11 NOTE — PROGRESS NOTES
Indication  ========    Amniocentesis. Karyotyping    History  ======    General History  Blood group: A  Rhesus: Rh positive  Risk Factors  Details: Fagile X Carrier  Details: Fadi-Sachs Carrier  History risk factors: AMA  Details: Asthma    Pregnancy History  ==============    Maternal Lab Tests  Test: Materni T21  Result of other maternal screening test: Negative    Maternal Assessment  =================    Weight 78 kg  Weight (lb) 172 lb  BP syst 112 mmHg  BP diast 76 mmHg    Method  ======    Transabdominal ultrasound examination, Voluson E10. View: Good view    Pregnancy  =========    Mejias pregnancy. Number of fetuses: 1    Dating  ======    LMP on: 7/1/2019  Cycle: irregular cycle  GA by LMP 19 w + 0 d  CHETAN by LMP: 4/6/2020  Assigned: based on ultrasound (CRL), selected on 08/28/2019  Assigned GA 16 w + 6 d  Assigned CHETAN: 4/21/2020  Pregnancy length 280 d    General Evaluation  ==============    Cardiac activity present.  Placenta posterior.  Amniotic fluid normal amount.    Amniocentesis  ============    Start 8:33 AM. End 8:55 AM  Instrument: TA 21G needle. Insertion site: lower abdomen mid. Method: transamniotic. Entries uterus: 1  Sample: obtained. Sample amount 27 ml. Sample quality: clear yellow  Sample identification confirmed    The patient was counseled about the risks of the procedure. The patient signed consents for the amniocentesis. A timeout was performed in  order to correctly identify patient information.    Amniocentesis was performed under direct ultrasound visualization with the single insertion of a 22 gauge needle. Approximately 24 ml of clear  amniotic fluid were removed and sent for FISH, fetal karyotype/microarray, and AFP.    The patient tolerated the procedure well. There were normal fetal heart tones after completion of the procedure.    The patient's blood type is A+. No Rhogam necessary.    The patient was given instructions regarding activity after the amniocentesis and was  counseled to inform us of symptoms such as leakage of  amniotic fluid, vaginal bleeding, significant cramping or fever following the procedure.      Evaluation  ========    Post cardiac activity: present, normal. FHR post 151 bpm    Rhesus Prophylaxis  ===============    Rh positive    Procedure  ========    See above.    Consultation  ==========    Reviewed testing for Fragile X and Fadi Sachs. Couple has agreed to Karyotype also. We will proceed with further testing as needed from  results.  Discussed procedure, risks/benefits and possible complications.      I spent 15 minutes in patient care management and consultation with greater than 50% face to face.      Impression  =========    Amniocentesis.      Recommendation  ==============    AF sent to [facility] for Fragile X testing, Fadi Sachs, karyotype and FISH if enough AF.  Patient sent for maternal sample.

## 2019-11-14 ENCOUNTER — TELEPHONE (OUTPATIENT)
Dept: MATERNAL FETAL MEDICINE | Facility: CLINIC | Age: 34
End: 2019-11-14

## 2019-11-14 NOTE — TELEPHONE ENCOUNTER
InSight Analysis result received via fax.     InSight Analysis result:   NO EVIDENCE OF NUMERICAL ABNORMALITY for chromosomes 13, 18, 21, X and Y.   Sex: MALE     Interpretation:  NORMAL INSIGHT RESULT       Message left for patient to call [facility] clinic at [phone] to review results.

## 2019-11-15 ENCOUNTER — TELEPHONE (OUTPATIENT)
Dept: MATERNAL FETAL MEDICINE | Facility: CLINIC | Age: 34
End: 2019-11-15

## 2019-11-15 NOTE — TELEPHONE ENCOUNTER
Patient has been notified of Amniocentesis Insight Analysis results:    No evidence of numerical abnormality for chromosomes 13, 18, 21 X and Y.    Sex: Male    Additional labs pending at this time for Fragile X and Fadi Sachs as well as a chromosome analysis.    Pt verbalized understanding of information.

## 2019-11-15 NOTE — PROGRESS NOTES
"Late Epic entry.  Transferred from [employer] Word.    Astrid Kaemmerling, a 34-year-old  1 at 16 weeks 3 days, presents for consultation regarding Inheritest results.  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  Paternal status is unknown.  There is no family history on the paternal side.  The patient is Cym[place].   is not of Eastern  or ashkenazi Mormonism heritage.      We discussed fragile X syndrome and how it is transmitted-see below.  I explained the biochemical impact of fragile X mental retardation 1 gene mutation and CGG repeats.  The couple has done a lot of reading it was an easy discussion because they already understand a good deal about the transmission and import of CGG repeats.  We discussed normal, intermediate, pre mutation and full mutation repeat size ranges.  The patient has had infertility issues and has been seen by the reproductive endocrine group.  Pregnancy was spontaneous, but her pre mutation status may indicate some level of ovarian insufficiency.    Fragile X syndrome is an X-linked disorder and the most common inherited cause of intellectual disability. Both males and females can be affected. The fragile X mental retardation 1 gene (FMR1), which produces fragile X MR protein, is located on the X chromosome at Xq27.3. The number of eoscliwa-yepnhqd-womhxfs (CGG) trinucleotide repeats in FMR1 have a "normal" range of about 5 to 44 CGG repeats. When there are ?44 CGG repeats, an adequate level of fragile X MFP is produced and is passed between generations without alteration.  An increase in CGG repeats within FMR1 gene may cause Fragile X syndrome.  Expansion of CGG repeats allows hypermethylation of FMR1, resulting in impaired transcription and reduced production of FMRP, which adversely impacts prenatal and  brain development.  As " repeat size increases, stability decreases, and further increases in the number of repeats in the FMR1 region become likely. The lower and upper boundaries of repeat length are usually noted as:    ?Normal -- 5 to 44 CGG repeats  ?Intermediate expansion -- 45 to 54 CGG repeats  ?Premutation -- 55 to 200 CGG repeats   ?Full mutation -- >200 CGG repeats    Prenatal diagnosis is available when a parent has >55 CGG repeats.  The degree of risk in offspring depends on the absolute CGG repeat size, the number and dispersion of AGG repeats, whether mother or father is the carrier, and the sex of the offspring.  Prenatal diagnosis is usually only performed when carrier is maternal as paternal sons will not inherit an X chromosome from carrier and daughters will be premutation carriers (CGG expansion does not occur in sperm).    The presence of Fadi-Sachs and the fetus would require paternal passage of an abnormal or mutation gene.  Given heritage, this is unlikely.  However the parents do desire fetal testing for fragile X, so paternal testing for either is unnecessary at this point.    I discussed amniocentesis as modality for testing.  The parents understand that there is a procedure risk of 1 in a [id number] for loss and possible complications were discussed.  They would like to proceed with amniocentesis for fetal Fadi-Sachs and fragile X testing with karyotype and FISH since patient is also AMA            RECOMMENDATIONS:    1.  Limited ultrasound anatomy with amniocentesis scheduled for 3 days hence.    2.  Amniocentesis for fetal fragile X and Fadi-Sachs testing and FISH/karyotype.      3. Anatomic survey at 18-20 weeks.  Follow-up for completion as necessary.    4.  Growth for AMA at 32 weeks.    5.  Other follow-up and counseling as dictated by results from testing.                    I spent 60 min in patient care management and counseling with greater than 50% face-to-face.

## 2019-11-21 ENCOUNTER — TELEPHONE (OUTPATIENT)
Dept: MATERNAL FETAL MEDICINE | Facility: CLINIC | Age: 34
End: 2019-11-21

## 2019-11-21 NOTE — TELEPHONE ENCOUNTER
Chromosome Analysis result received via fax. Report reviewed by Dr Fuentes.     Chromosome Analysis result:   46, XY  Male karyotype    Interpretation:  This analysis shows no evidence of clinically significant numerical or structural chromosome abnormalities.     Called to notify pt. Message left for patient to call [facility] clinic at [phone] for Chromosme analysis results.        10:10 am- Pt returning call. Informed of result as documented above. Confirmed we are awaiting results on fragile X and Tach Sachs full sequencing. Pt verbalized understanding of information.

## 2019-11-26 ENCOUNTER — TELEPHONE (OUTPATIENT)
Dept: MATERNAL FETAL MEDICINE | Facility: CLINIC | Age: 34
End: 2019-11-26

## 2019-11-26 ENCOUNTER — PATIENT MESSAGE (OUTPATIENT)
Dept: MATERNAL FETAL MEDICINE | Facility: CLINIC | Age: 34
End: 2019-11-26

## 2019-11-26 ENCOUNTER — PATIENT MESSAGE (OUTPATIENT)
Dept: OBSTETRICS AND GYNECOLOGY | Facility: CLINIC | Age: 34
End: 2019-11-26

## 2019-11-26 NOTE — TELEPHONE ENCOUNTER
1:24 pm: Spoke with Beck at [facility] who states that culturing the cells for Fragile X and Fadi Sachs testing takes 2 weeks. The cells should be finished with culturing on 11/27. At that point, they will be sent to the testing lab. With the Thanksgiving holiday, Beck states to allow for send out of cultured cells on 11/29 for receipt by testing lab on 11/30. Once the cultured cells are received at the testing lab, Fragile X takes 7-10 days to result and Fadi-Sachs takes 21 days to result.    1:29 pm: Call placed to Sarah and relayed all of above information. Patient verbalizes understanding.

## 2019-11-26 NOTE — TELEPHONE ENCOUNTER
LVM for [facility] counselor of the day at [phone] regarding timing of results on Fragile X and Fadi Sachs testing. Return phone number given.

## 2019-11-27 ENCOUNTER — TELEPHONE (OUTPATIENT)
Dept: OBSTETRICS AND GYNECOLOGY | Facility: CLINIC | Age: 34
End: 2019-11-27

## 2019-11-27 DIAGNOSIS — Z36.89 ENCOUNTER FOR FETAL ANATOMIC SURVEY: Primary | ICD-10-CM

## 2019-11-27 NOTE — TELEPHONE ENCOUNTER
Carmencita, RN with [facility], was calling to let us know that pt is has registered with the [facility] [employer] with her [facility] ins. Left contact number along with ext if MD or MA needs to reach her. Thank you.

## 2019-11-27 NOTE — TELEPHONE ENCOUNTER
Spoke with pt and let her know that [facility] contacted her on 11/4 to schedule her anatomy scan. Pt given the number to [facility] to schedule. tasha

## 2019-12-03 ENCOUNTER — PROCEDURE VISIT (OUTPATIENT)
Dept: MATERNAL FETAL MEDICINE | Facility: CLINIC | Age: 34
End: 2019-12-03
Payer: COMMERCIAL

## 2019-12-03 ENCOUNTER — INITIAL CONSULT (OUTPATIENT)
Dept: MATERNAL FETAL MEDICINE | Facility: CLINIC | Age: 34
End: 2019-12-03
Payer: COMMERCIAL

## 2019-12-03 VITALS
DIASTOLIC BLOOD PRESSURE: 66 MMHG | SYSTOLIC BLOOD PRESSURE: 108 MMHG | WEIGHT: 177.25 LBS | BODY MASS INDEX: 26.95 KG/M2

## 2019-12-03 DIAGNOSIS — Q99.2 FRAGILE X SYNDROME: Primary | ICD-10-CM

## 2019-12-03 DIAGNOSIS — Z36.89 ENCOUNTER FOR FETAL ANATOMIC SURVEY: ICD-10-CM

## 2019-12-03 PROCEDURE — 76811 PR US, OB FETAL EVAL & EXAM, TRANSABDOM,FIRST GESTATION: ICD-10-PCS | Mod: S$GLB,,, | Performed by: PEDIATRICS

## 2019-12-03 PROCEDURE — 76811 OB US DETAILED SNGL FETUS: CPT | Mod: S$GLB,,, | Performed by: PEDIATRICS

## 2019-12-03 PROCEDURE — 3008F PR BODY MASS INDEX (BMI) DOCUMENTED: ICD-10-PCS | Mod: CPTII,S$GLB,, | Performed by: PEDIATRICS

## 2019-12-03 PROCEDURE — 99999 PR PBB SHADOW E&M-EST. PATIENT-LVL II: CPT | Mod: PBBFAC,,, | Performed by: PEDIATRICS

## 2019-12-03 PROCEDURE — 3008F BODY MASS INDEX DOCD: CPT | Mod: CPTII,S$GLB,, | Performed by: PEDIATRICS

## 2019-12-03 PROCEDURE — 99999 PR PBB SHADOW E&M-EST. PATIENT-LVL II: ICD-10-PCS | Mod: PBBFAC,,, | Performed by: PEDIATRICS

## 2019-12-03 PROCEDURE — 99213 PR OFFICE/OUTPT VISIT, EST, LEVL III, 20-29 MIN: ICD-10-PCS | Mod: 25,S$GLB,, | Performed by: PEDIATRICS

## 2019-12-03 PROCEDURE — 99213 OFFICE O/P EST LOW 20 MIN: CPT | Mod: 25,S$GLB,, | Performed by: PEDIATRICS

## 2019-12-10 ENCOUNTER — TELEPHONE (OUTPATIENT)
Dept: MATERNAL FETAL MEDICINE | Facility: CLINIC | Age: 34
End: 2019-12-10

## 2019-12-10 NOTE — TELEPHONE ENCOUNTER
After review by Dr. Fuentes, the patient was notified of Fragile X Analysis PCR and Southern Blot:       This fetus is predicted to be unaffected with fragile   X Syndrome.    Fadi-Sachs result pending at this time and result expected the week of 12/23/19. Patient will be contacted as soon as results are received.    Pt verbalized understanding of information.

## 2019-12-10 NOTE — PROGRESS NOTES
Indication  ========    RT MD: Targeted Anatomy/Amnio FISH & Karyotype WNL: Taysachs & Fragile X in process    History  ======    General History  Blood group: A  Rhesus: Rh positive  Risk Factors  Details: Fagile X Carrier  Details: Fadi-Sachs Carrier  History risk factors: AMA  Details: Asthma    Pregnancy History  ==============    Maternal Lab Tests  Test: Materni T21  Result of other maternal screening test: Negative    Maternal Assessment  =================    Weight 80 kg  Weight (lb) 176 lb  BP syst 108 mmHg  BP diast 66 mmHg    Method  ======    Transabdominal ultrasound examination, 2D Color Doppler, Voluson E10, Transabdominal ultrasound examination. View: Good view    Pregnancy  =========    Mejias pregnancy. Number of fetuses: 1    Dating  ======    LMP on: 7/1/2019  Cycle: irregular cycle  GA by LMP 22 w + 1 d  CHETAN by LMP: 4/6/2020  Ultrasound examination on: 12/3/2019  GA by U/S based upon: AC, BPD, Femur, HC  GA by U/S 20 w + 5 d  CHETAN by U/S: 4/16/2020  Assigned: based on ultrasound (CRL), selected on 08/28/2019  Assigned GA 20 w + 0 d  Assigned CHETAN: 4/21/2020  Pregnancy length 280 d    General Evaluation  ==============    Cardiac activity present.  bpm.  Fetal movements visualized.  Presentation cephalic.  Placenta Placental site: posterior.  Umbilical cord Cord vessels: 3 vessel cord. Insertion site: normal insertion.  Amniotic fluid Amount of AF: normal amount.    Fetal Biometry  ============    Standard  BPD 49.4 mm  21w 0d                Hadlock    OFD 62.3 mm  21w 2d                Rama    .9 mm  20w 2d                Hadlock    Cerebellum tr 22.6 mm  21w 6d                Garcia    Nuchal fold 4.2 mm  .4 mm  21w 0d                Hadlock    Femur 33.8 mm  20w 4d                Hadlock    HC / AC 1.13     g          37%        Rivera    EFW (lb) 0 lb  EFW (oz) 13 oz  EFW by: Hadlock (BPD-HC-AC-FL)  Extended   4.9 mm  CM 5.3 mm    Head / Face /  Neck  Cephalic index 0.79    Extremities / Bony Struc  FL / BPD 0.68    FL / AC 0.21    Other Structures   bpm    Fetal Anatomy  ===========    Cranium: normal  Lateral ventricles: normal  Choroid plexus: normal  Midline falx: normal  Cavum septi pellucidi: normal  Cerebellum: normal  Cisterna magna: normal  Head / Neck  Vermis: normal  Neck: normal  Nuchal fold: normal  Lips: normal  Profile: normal  Nose: normal  RVOT view: normal  LVOT view: normal  Heart / Thorax  4-chamber view: 4-chamber normal, septum normal  Situs: situs solitus (normal)  Aortic arch view: normal  Ductal arch view: suboptimal  SVC: normal  IVC: normal  3-vessel view: normal  3-vessel-trachea view: normal  Rt lung: normal  Lt lung: normal  Diaphragm: normal  Cord insertion: normal  Stomach: normal  Kidneys: normal  Bladder: normal  Genitals: normal  Abdomen  Liver: normal  Bowel: normal  Rt renal artery: normal  Lt renal artery: normal  Cervical spine: normal  Thoracic spine: normal  Lumbar spine: normal  Sacral spine: normal  Spine: transverse views of spine appears wnl  Rt arm: normal  Lt arm: normal  Rt leg: normal  Lt leg: normal  Position of hands: normal  Position of feet: normal  Gender: male  Wants to know gender: yes    Maternal Structures  ===============    Uterus / Cervix  Uterus: Normal  Cervix: Visualized  Approach: Transabdominal  Cervical length 39.3 mm  Ovaries / Tubes / Adnexa  Rt ovary: Normal  Lt ovary: Normal          Consultation  ==========    Patient returns for anatomic survey and discussion of amniocentesis results. FISH and karyotype are WNL. However, testing for Fadi-Sachs  and Fragile X are not yet completed.    US results were reviewed and discussed with patient and . Awaiting the above results for further discussion.            I spent 15 minutes in patient care management and consultation with >50% face to face.            Impression  =========    A detailed fetal anatomic ultrasound examination  was performed today due to the following high risk indication: AMA, Fragile X    No fetal structural abnormalities are identified today (DA not well seen), and fetal size is appropriate for EGA.    Cervical length is normal.  Placental location is normal without evidence of previa.              Recommendation  ==============    US for growth and anatomy at 32 weeks'.  Will consult for Fragile X and Fadi Sachs when results are available.

## 2019-12-11 ENCOUNTER — TELEPHONE (OUTPATIENT)
Dept: MATERNAL FETAL MEDICINE | Facility: CLINIC | Age: 34
End: 2019-12-11

## 2019-12-11 ENCOUNTER — PATIENT MESSAGE (OUTPATIENT)
Dept: MATERNAL FETAL MEDICINE | Facility: CLINIC | Age: 34
End: 2019-12-11

## 2020-01-02 ENCOUNTER — ROUTINE PRENATAL (OUTPATIENT)
Dept: OBSTETRICS AND GYNECOLOGY | Facility: CLINIC | Age: 35
End: 2020-01-02
Payer: COMMERCIAL

## 2020-01-02 VITALS
WEIGHT: 183.63 LBS | SYSTOLIC BLOOD PRESSURE: 112 MMHG | BODY MASS INDEX: 27.92 KG/M2 | DIASTOLIC BLOOD PRESSURE: 68 MMHG

## 2020-01-02 DIAGNOSIS — Z3A.24 24 WEEKS GESTATION OF PREGNANCY: Primary | ICD-10-CM

## 2020-01-02 DIAGNOSIS — Z34.02 ENCOUNTER FOR SUPERVISION OF NORMAL FIRST PREGNANCY IN SECOND TRIMESTER: ICD-10-CM

## 2020-01-02 PROCEDURE — 99999 PR PBB SHADOW E&M-EST. PATIENT-LVL II: CPT | Mod: PBBFAC,,, | Performed by: OBSTETRICS & GYNECOLOGY

## 2020-01-02 PROCEDURE — 0502F PR SUBSEQUENT PRENATAL CARE: ICD-10-PCS | Mod: CPTII,S$GLB,, | Performed by: OBSTETRICS & GYNECOLOGY

## 2020-01-02 PROCEDURE — 99999 PR PBB SHADOW E&M-EST. PATIENT-LVL II: ICD-10-PCS | Mod: PBBFAC,,, | Performed by: OBSTETRICS & GYNECOLOGY

## 2020-01-02 PROCEDURE — 0502F SUBSEQUENT PRENATAL CARE: CPT | Mod: CPTII,S$GLB,, | Performed by: OBSTETRICS & GYNECOLOGY

## 2020-01-24 ENCOUNTER — TELEPHONE (OUTPATIENT)
Dept: OBSTETRICS AND GYNECOLOGY | Facility: CLINIC | Age: 35
End: 2020-01-24

## 2020-01-24 NOTE — TELEPHONE ENCOUNTER
Dr Gandhi pt calling, ob 27wks has asthma and needs a refill on medication. Pt went to her doctor to get this but was turn away cause she is pregnant. Pt wants to know what to do.Pt # [phone]

## 2020-01-24 NOTE — TELEPHONE ENCOUNTER
27 3/7 week OB pt states she tried to establish care with a PCP in order to get her asthma medication but she was turned away because she is pregnant.  She needs refills on [id number].  Med pended per pt.      Qvar pended

## 2020-01-27 DIAGNOSIS — J45.909 MILD ASTHMA, UNSPECIFIED WHETHER COMPLICATED, UNSPECIFIED WHETHER PERSISTENT: Primary | ICD-10-CM

## 2020-01-27 RX ORDER — ALBUTEROL SULFATE 90 UG/1
2 AEROSOL, METERED RESPIRATORY (INHALATION) EVERY 6 HOURS PRN
Qty: 18 G | Refills: 3 | Status: SHIPPED | OUTPATIENT
Start: 2020-01-27 | End: 2021-06-01

## 2020-02-06 ENCOUNTER — ROUTINE PRENATAL (OUTPATIENT)
Dept: OBSTETRICS AND GYNECOLOGY | Facility: CLINIC | Age: 35
End: 2020-02-06
Payer: COMMERCIAL

## 2020-02-06 ENCOUNTER — CLINICAL SUPPORT (OUTPATIENT)
Dept: OBSTETRICS AND GYNECOLOGY | Facility: CLINIC | Age: 35
End: 2020-02-06
Payer: COMMERCIAL

## 2020-02-06 VITALS
WEIGHT: 190.13 LBS | BODY MASS INDEX: 28.91 KG/M2 | SYSTOLIC BLOOD PRESSURE: 110 MMHG | DIASTOLIC BLOOD PRESSURE: 70 MMHG

## 2020-02-06 DIAGNOSIS — Z23 NEED FOR TDAP VACCINATION: ICD-10-CM

## 2020-02-06 DIAGNOSIS — Z3A.29 29 WEEKS GESTATION OF PREGNANCY: Primary | ICD-10-CM

## 2020-02-06 DIAGNOSIS — Z34.03 ENCOUNTER FOR SUPERVISION OF NORMAL FIRST PREGNANCY IN THIRD TRIMESTER: ICD-10-CM

## 2020-02-06 DIAGNOSIS — Z23 NEED FOR TDAP VACCINATION: Primary | ICD-10-CM

## 2020-02-06 PROCEDURE — 99999 PR PBB SHADOW E&M-EST. PATIENT-LVL I: CPT | Mod: PBBFAC,,,

## 2020-02-06 PROCEDURE — 0502F PR SUBSEQUENT PRENATAL CARE: ICD-10-PCS | Mod: CPTII,S$GLB,, | Performed by: OBSTETRICS & GYNECOLOGY

## 2020-02-06 PROCEDURE — 90471 TDAP VACCINE GREATER THAN OR EQUAL TO 7YO IM: ICD-10-PCS | Mod: S$GLB,,, | Performed by: OBSTETRICS & GYNECOLOGY

## 2020-02-06 PROCEDURE — 90471 IMMUNIZATION ADMIN: CPT | Mod: S$GLB,,, | Performed by: OBSTETRICS & GYNECOLOGY

## 2020-02-06 PROCEDURE — 0502F SUBSEQUENT PRENATAL CARE: CPT | Mod: CPTII,S$GLB,, | Performed by: OBSTETRICS & GYNECOLOGY

## 2020-02-06 PROCEDURE — 99999 PR PBB SHADOW E&M-EST. PATIENT-LVL I: ICD-10-PCS | Mod: PBBFAC,,,

## 2020-02-06 PROCEDURE — 90715 TDAP VACCINE GREATER THAN OR EQUAL TO 7YO IM: ICD-10-PCS | Mod: S$GLB,,, | Performed by: OBSTETRICS & GYNECOLOGY

## 2020-02-06 PROCEDURE — 90715 TDAP VACCINE 7 YRS/> IM: CPT | Mod: S$GLB,,, | Performed by: OBSTETRICS & GYNECOLOGY

## 2020-02-06 PROCEDURE — 99999 PR PBB SHADOW E&M-EST. PATIENT-LVL III: ICD-10-PCS | Mod: PBBFAC,,, | Performed by: OBSTETRICS & GYNECOLOGY

## 2020-02-06 PROCEDURE — 99999 PR PBB SHADOW E&M-EST. PATIENT-LVL III: CPT | Mod: PBBFAC,,, | Performed by: OBSTETRICS & GYNECOLOGY

## 2020-02-06 RX ORDER — CHOLECALCIFEROL (VITAMIN D3) 25 MCG
1000 TABLET ORAL DAILY
COMMUNITY

## 2020-02-06 RX ORDER — FOLIC ACID 0.8 MG
800 TABLET ORAL DAILY
COMMUNITY
End: 2020-09-01

## 2020-02-06 RX ORDER — PYRIDOXINE HCL (VITAMIN B6) 100 MG
100 TABLET ORAL DAILY
COMMUNITY
End: 2020-09-01

## 2020-02-06 RX ORDER — BECLOMETHASONE DIPROPIONATE HFA 80 UG/1
AEROSOL, METERED RESPIRATORY (INHALATION)
COMMUNITY
Start: 2020-01-18 | End: 2020-02-11

## 2020-02-06 NOTE — PROGRESS NOTES
She complains of constipation. Made some dietary changes and it's better.  Tdap  Reschedule 1 hgtt and CBC tomorrow morning

## 2020-02-06 NOTE — PROGRESS NOTES
Ordering Provider:      Patient here to receive  t dap to the LEFT  deltoid. Tolerated well, no reaction noted. Instructed to wait 15 minutes after administration for monitoring.      Pre pain scale: none     Post pain scale: none

## 2020-02-07 ENCOUNTER — LAB VISIT (OUTPATIENT)
Dept: LAB | Facility: HOSPITAL | Age: 35
End: 2020-02-07
Attending: OBSTETRICS & GYNECOLOGY
Payer: COMMERCIAL

## 2020-02-07 DIAGNOSIS — Z3A.24 24 WEEKS GESTATION OF PREGNANCY: ICD-10-CM

## 2020-02-07 LAB
BASOPHILS # BLD AUTO: 0.03 K/UL (ref 0–0.2)
BASOPHILS NFR BLD: 0.3 % (ref 0–1.9)
DIFFERENTIAL METHOD: ABNORMAL
EOSINOPHIL # BLD AUTO: 0.5 K/UL (ref 0–0.5)
EOSINOPHIL NFR BLD: 4.5 % (ref 0–8)
ERYTHROCYTE [DISTWIDTH] IN BLOOD BY AUTOMATED COUNT: 12.6 % (ref 11.5–14.5)
GLUCOSE SERPL-MCNC: 100 MG/DL (ref 70–140)
HCT VFR BLD AUTO: 32.8 % (ref 37–48.5)
HGB BLD-MCNC: 10.4 G/DL (ref 12–16)
IMM GRANULOCYTES # BLD AUTO: 0.07 K/UL (ref 0–0.04)
IMM GRANULOCYTES NFR BLD AUTO: 0.7 % (ref 0–0.5)
LYMPHOCYTES # BLD AUTO: 1.4 K/UL (ref 1–4.8)
LYMPHOCYTES NFR BLD: 14 % (ref 18–48)
MCH RBC QN AUTO: 30.6 PG (ref 27–31)
MCHC RBC AUTO-ENTMCNC: 31.7 G/DL (ref 32–36)
MCV RBC AUTO: 97 FL (ref 82–98)
MONOCYTES # BLD AUTO: 0.7 K/UL (ref 0.3–1)
MONOCYTES NFR BLD: 6.5 % (ref 4–15)
NEUTROPHILS # BLD AUTO: 7.6 K/UL (ref 1.8–7.7)
NEUTROPHILS NFR BLD: 74 % (ref 38–73)
NRBC BLD-RTO: 0 /100 WBC
PLATELET # BLD AUTO: 185 K/UL (ref 150–350)
PMV BLD AUTO: 10.2 FL (ref 9.2–12.9)
RBC # BLD AUTO: 3.4 M/UL (ref 4–5.4)
WBC # BLD AUTO: 10.22 K/UL (ref 3.9–12.7)

## 2020-02-07 PROCEDURE — 82950 GLUCOSE TEST: CPT

## 2020-02-07 PROCEDURE — 85025 COMPLETE CBC W/AUTO DIFF WBC: CPT

## 2020-02-10 ENCOUNTER — PATIENT MESSAGE (OUTPATIENT)
Dept: OBSTETRICS AND GYNECOLOGY | Facility: CLINIC | Age: 35
End: 2020-02-10

## 2020-02-11 DIAGNOSIS — O99.513 ASTHMA AFFECTING PREGNANCY IN THIRD TRIMESTER: Primary | ICD-10-CM

## 2020-02-11 DIAGNOSIS — O09.513 ADVANCED MATERNAL AGE, 1ST PREGNANCY, THIRD TRIMESTER: Primary | ICD-10-CM

## 2020-02-11 DIAGNOSIS — J45.909 ASTHMA AFFECTING PREGNANCY IN THIRD TRIMESTER: Primary | ICD-10-CM

## 2020-02-17 ENCOUNTER — PATIENT MESSAGE (OUTPATIENT)
Dept: OBSTETRICS AND GYNECOLOGY | Facility: CLINIC | Age: 35
End: 2020-02-17

## 2020-02-19 ENCOUNTER — PATIENT MESSAGE (OUTPATIENT)
Dept: OBSTETRICS AND GYNECOLOGY | Facility: CLINIC | Age: 35
End: 2020-02-19

## 2020-02-19 ENCOUNTER — ROUTINE PRENATAL (OUTPATIENT)
Dept: OBSTETRICS AND GYNECOLOGY | Facility: CLINIC | Age: 35
End: 2020-02-19
Payer: COMMERCIAL

## 2020-02-19 VITALS
WEIGHT: 190.94 LBS | DIASTOLIC BLOOD PRESSURE: 60 MMHG | BODY MASS INDEX: 29.03 KG/M2 | SYSTOLIC BLOOD PRESSURE: 110 MMHG

## 2020-02-19 DIAGNOSIS — Z34.03 ENCOUNTER FOR SUPERVISION OF NORMAL FIRST PREGNANCY IN THIRD TRIMESTER: ICD-10-CM

## 2020-02-19 DIAGNOSIS — Z3A.31 31 WEEKS GESTATION OF PREGNANCY: Primary | ICD-10-CM

## 2020-02-19 PROCEDURE — 0502F PR SUBSEQUENT PRENATAL CARE: ICD-10-PCS | Mod: CPTII,S$GLB,, | Performed by: OBSTETRICS & GYNECOLOGY

## 2020-02-19 PROCEDURE — 0502F SUBSEQUENT PRENATAL CARE: CPT | Mod: CPTII,S$GLB,, | Performed by: OBSTETRICS & GYNECOLOGY

## 2020-02-19 PROCEDURE — 99999 PR PBB SHADOW E&M-EST. PATIENT-LVL II: ICD-10-PCS | Mod: PBBFAC,,, | Performed by: OBSTETRICS & GYNECOLOGY

## 2020-02-19 PROCEDURE — 99999 PR PBB SHADOW E&M-EST. PATIENT-LVL II: CPT | Mod: PBBFAC,,, | Performed by: OBSTETRICS & GYNECOLOGY

## 2020-02-19 NOTE — PROGRESS NOTES
Good fetal movement  Doesn't want indxn at 39 weeks - understands risks and benefits.  Will induce within week after EDC.

## 2020-03-02 ENCOUNTER — ROUTINE PRENATAL (OUTPATIENT)
Dept: OBSTETRICS AND GYNECOLOGY | Facility: CLINIC | Age: 35
End: 2020-03-02
Attending: OBSTETRICS & GYNECOLOGY
Payer: COMMERCIAL

## 2020-03-02 VITALS
DIASTOLIC BLOOD PRESSURE: 62 MMHG | SYSTOLIC BLOOD PRESSURE: 106 MMHG | BODY MASS INDEX: 29.55 KG/M2 | WEIGHT: 194.31 LBS

## 2020-03-02 DIAGNOSIS — Z3A.35 35 WEEKS GESTATION OF PREGNANCY: Primary | ICD-10-CM

## 2020-03-02 DIAGNOSIS — O09.513 ADVANCED MATERNAL AGE, 1ST PREGNANCY, THIRD TRIMESTER: ICD-10-CM

## 2020-03-02 DIAGNOSIS — Z34.03 ENCOUNTER FOR SUPERVISION OF NORMAL FIRST PREGNANCY IN THIRD TRIMESTER: ICD-10-CM

## 2020-03-02 PROCEDURE — 99999 PR PBB SHADOW E&M-EST. PATIENT-LVL III: CPT | Mod: PBBFAC,,, | Performed by: OBSTETRICS & GYNECOLOGY

## 2020-03-02 PROCEDURE — 76816 OB US FOLLOW-UP PER FETUS: CPT | Mod: S$GLB,,, | Performed by: OBSTETRICS & GYNECOLOGY

## 2020-03-02 PROCEDURE — 99999 PR PBB SHADOW E&M-EST. PATIENT-LVL III: ICD-10-PCS | Mod: PBBFAC,,, | Performed by: OBSTETRICS & GYNECOLOGY

## 2020-03-02 PROCEDURE — 0502F PR SUBSEQUENT PRENATAL CARE: ICD-10-PCS | Mod: CPTII,S$GLB,, | Performed by: OBSTETRICS & GYNECOLOGY

## 2020-03-02 PROCEDURE — 76816 PR  US,PREGNANT UTERUS,F/U,TRANSABD APP: ICD-10-PCS | Mod: S$GLB,,, | Performed by: OBSTETRICS & GYNECOLOGY

## 2020-03-02 PROCEDURE — 0502F SUBSEQUENT PRENATAL CARE: CPT | Mod: CPTII,S$GLB,, | Performed by: OBSTETRICS & GYNECOLOGY

## 2020-03-02 RX ORDER — FERROUS SULFATE 325(65) MG
325 TABLET, DELAYED RELEASE (ENTERIC COATED) ORAL
COMMUNITY
End: 2020-09-01

## 2020-03-08 ENCOUNTER — PATIENT MESSAGE (OUTPATIENT)
Dept: PEDIATRICS | Facility: CLINIC | Age: 35
End: 2020-03-08

## 2020-03-10 ENCOUNTER — OFFICE VISIT (OUTPATIENT)
Dept: PEDIATRICS | Facility: CLINIC | Age: 35
End: 2020-03-10
Payer: COMMERCIAL

## 2020-03-10 DIAGNOSIS — Z34.90 PRE-BIRTH VISIT FOR EXPECTANT MOTHER: Primary | ICD-10-CM

## 2020-03-10 PROCEDURE — 99999 PR PBB SHADOW E&M-EST. PATIENT-LVL II: CPT | Mod: PBBFAC,,, | Performed by: PEDIATRICS

## 2020-03-10 PROCEDURE — 99499 UNLISTED E&M SERVICE: CPT | Mod: S$GLB,,, | Performed by: PEDIATRICS

## 2020-03-10 PROCEDURE — 99499 NO LOS: ICD-10-PCS | Mod: S$GLB,,, | Performed by: PEDIATRICS

## 2020-03-10 PROCEDURE — 99999 PR PBB SHADOW E&M-EST. PATIENT-LVL II: ICD-10-PCS | Mod: PBBFAC,,, | Performed by: PEDIATRICS

## 2020-03-10 NOTE — PROGRESS NOTES
CC: Prenatal visit    Due date: end of April  Gender: boy  Social history: 1st baby.  Mom is from [place].  Mom works for the [employer] (coordinates artists[employer]in[employer]residence)  Dad works in [employer] industry  Plan for au pair [facility] practice care reviewed.  All questions answered.

## 2020-03-16 ENCOUNTER — PATIENT MESSAGE (OUTPATIENT)
Dept: OBSTETRICS AND GYNECOLOGY | Facility: CLINIC | Age: 35
End: 2020-03-16

## 2020-03-16 ENCOUNTER — TELEPHONE (OUTPATIENT)
Dept: OBSTETRICS AND GYNECOLOGY | Facility: CLINIC | Age: 35
End: 2020-03-16

## 2020-03-17 ENCOUNTER — PATIENT MESSAGE (OUTPATIENT)
Dept: OBSTETRICS AND GYNECOLOGY | Facility: CLINIC | Age: 35
End: 2020-03-17

## 2020-03-17 NOTE — TELEPHONE ENCOUNTER
Patient came into office today.  Explained all of the details pertaining to connected mom with patient.  Explained to patient that it may take up to the end of this week for the details to be iron doubt for connected mom to work for all pregnant patients.  Patient was given the number to call for connected mom and for 2 separate 0 bar locations to call if she has questions about when connected mom kits will be available and mailed.  Patient will let me know if she finds out anything.

## 2020-03-18 ENCOUNTER — LAB VISIT (OUTPATIENT)
Dept: LAB | Facility: HOSPITAL | Age: 35
End: 2020-03-18
Attending: OBSTETRICS & GYNECOLOGY
Payer: COMMERCIAL

## 2020-03-18 ENCOUNTER — ROUTINE PRENATAL (OUTPATIENT)
Dept: OBSTETRICS AND GYNECOLOGY | Facility: CLINIC | Age: 35
End: 2020-03-18
Payer: COMMERCIAL

## 2020-03-18 VITALS — SYSTOLIC BLOOD PRESSURE: 116 MMHG | DIASTOLIC BLOOD PRESSURE: 70 MMHG | WEIGHT: 201.5 LBS | BODY MASS INDEX: 30.64 KG/M2

## 2020-03-18 DIAGNOSIS — Z34.03 ENCOUNTER FOR SUPERVISION OF NORMAL FIRST PREGNANCY IN THIRD TRIMESTER: ICD-10-CM

## 2020-03-18 DIAGNOSIS — Z3A.35 35 WEEKS GESTATION OF PREGNANCY: ICD-10-CM

## 2020-03-18 DIAGNOSIS — Z3A.35 35 WEEKS GESTATION OF PREGNANCY: Primary | ICD-10-CM

## 2020-03-18 PROCEDURE — 0502F PR SUBSEQUENT PRENATAL CARE: ICD-10-PCS | Mod: CPTII,S$GLB,, | Performed by: OBSTETRICS & GYNECOLOGY

## 2020-03-18 PROCEDURE — 0502F SUBSEQUENT PRENATAL CARE: CPT | Mod: CPTII,S$GLB,, | Performed by: OBSTETRICS & GYNECOLOGY

## 2020-03-18 PROCEDURE — 99999 PR PBB SHADOW E&M-EST. PATIENT-LVL III: ICD-10-PCS | Mod: PBBFAC,,, | Performed by: OBSTETRICS & GYNECOLOGY

## 2020-03-18 PROCEDURE — 87081 CULTURE SCREEN ONLY: CPT

## 2020-03-18 PROCEDURE — 86592 SYPHILIS TEST NON-TREP QUAL: CPT

## 2020-03-18 PROCEDURE — 99999 PR PBB SHADOW E&M-EST. PATIENT-LVL III: CPT | Mod: PBBFAC,,, | Performed by: OBSTETRICS & GYNECOLOGY

## 2020-03-18 PROCEDURE — 86703 HIV-1/HIV-2 1 RESULT ANTBDY: CPT

## 2020-03-18 RX ORDER — MAGNESIUM 30 MG
TABLET ORAL ONCE
COMMUNITY
End: 2020-09-01

## 2020-03-18 RX ORDER — CALCIUM CARBONATE 600 MG
600 TABLET ORAL ONCE
COMMUNITY
End: 2020-09-01

## 2020-03-19 LAB
HIV 1+2 AB+HIV1 P24 AG SERPL QL IA: NEGATIVE
RPR SER QL: NORMAL

## 2020-03-20 LAB — BACTERIA SPEC AEROBE CULT: NORMAL

## 2020-03-22 ENCOUNTER — PATIENT MESSAGE (OUTPATIENT)
Dept: OBSTETRICS AND GYNECOLOGY | Facility: CLINIC | Age: 35
End: 2020-03-22

## 2020-03-23 ENCOUNTER — PATIENT MESSAGE (OUTPATIENT)
Dept: ADMINISTRATIVE | Facility: OTHER | Age: 35
End: 2020-03-23

## 2020-03-26 ENCOUNTER — PATIENT MESSAGE (OUTPATIENT)
Dept: OBSTETRICS AND GYNECOLOGY | Facility: CLINIC | Age: 35
End: 2020-03-26

## 2020-03-26 ENCOUNTER — OFFICE VISIT (OUTPATIENT)
Dept: OBSTETRICS AND GYNECOLOGY | Facility: CLINIC | Age: 35
End: 2020-03-26
Payer: COMMERCIAL

## 2020-03-26 DIAGNOSIS — Z34.03 ENCOUNTER FOR SUPERVISION OF NORMAL FIRST PREGNANCY IN THIRD TRIMESTER: ICD-10-CM

## 2020-03-26 DIAGNOSIS — Z3A.36 36 WEEKS GESTATION OF PREGNANCY: Primary | ICD-10-CM

## 2020-03-26 PROCEDURE — 0502F PR SUBSEQUENT PRENATAL CARE: ICD-10-PCS | Mod: CPTII,GT,S$GLB, | Performed by: OBSTETRICS & GYNECOLOGY

## 2020-03-26 PROCEDURE — 0502F SUBSEQUENT PRENATAL CARE: CPT | Mod: CPTII,GT,S$GLB, | Performed by: OBSTETRICS & GYNECOLOGY

## 2020-03-26 NOTE — PROGRESS NOTES
No regular contractions.  She will take BP and weight and email me  I messaged Dr. Snowden to see if ECV is still an option and if it is recommended based on SARS-CoV current situation or if C/S preferred.  Patient will see me Tuesday and I will scan to see if still breech.  Final decision on delivery plan then.

## 2020-03-31 ENCOUNTER — ROUTINE PRENATAL (OUTPATIENT)
Dept: OBSTETRICS AND GYNECOLOGY | Facility: CLINIC | Age: 35
End: 2020-03-31
Payer: COMMERCIAL

## 2020-03-31 VITALS
DIASTOLIC BLOOD PRESSURE: 72 MMHG | SYSTOLIC BLOOD PRESSURE: 122 MMHG | WEIGHT: 202.81 LBS | BODY MASS INDEX: 30.84 KG/M2

## 2020-03-31 DIAGNOSIS — Z3A.37 37 WEEKS GESTATION OF PREGNANCY: Primary | ICD-10-CM

## 2020-03-31 DIAGNOSIS — Z34.03 ENCOUNTER FOR SUPERVISION OF NORMAL FIRST PREGNANCY IN THIRD TRIMESTER: ICD-10-CM

## 2020-03-31 PROCEDURE — 0502F SUBSEQUENT PRENATAL CARE: CPT | Mod: CPTII,S$GLB,, | Performed by: OBSTETRICS & GYNECOLOGY

## 2020-03-31 PROCEDURE — 99999 PR PBB SHADOW E&M-EST. PATIENT-LVL III: CPT | Mod: PBBFAC,,, | Performed by: OBSTETRICS & GYNECOLOGY

## 2020-03-31 PROCEDURE — 99999 PR PBB SHADOW E&M-EST. PATIENT-LVL III: ICD-10-PCS | Mod: PBBFAC,,, | Performed by: OBSTETRICS & GYNECOLOGY

## 2020-03-31 PROCEDURE — 0502F PR SUBSEQUENT PRENATAL CARE: ICD-10-PCS | Mod: CPTII,S$GLB,, | Performed by: OBSTETRICS & GYNECOLOGY

## 2020-03-31 NOTE — PROGRESS NOTES
Discussed ECV.  Patient requesting telemed visit with Isi.  Will send request to \[facility]\"" office.  No contractions.  Good movement

## 2020-04-02 ENCOUNTER — OFFICE VISIT (OUTPATIENT)
Dept: MATERNAL FETAL MEDICINE | Facility: CLINIC | Age: 35
End: 2020-04-02
Payer: COMMERCIAL

## 2020-04-02 DIAGNOSIS — Z3A.37 37 WEEKS GESTATION OF PREGNANCY: ICD-10-CM

## 2020-04-02 DIAGNOSIS — J45.909 ASTHMA AFFECTING PREGNANCY IN THIRD TRIMESTER: ICD-10-CM

## 2020-04-02 DIAGNOSIS — O99.513 ASTHMA AFFECTING PREGNANCY IN THIRD TRIMESTER: ICD-10-CM

## 2020-04-02 PROCEDURE — 99214 PR OFFICE/OUTPT VISIT, EST, LEVL IV, 30-39 MIN: ICD-10-PCS | Mod: GT,,, | Performed by: OBSTETRICS & GYNECOLOGY

## 2020-04-02 PROCEDURE — 99214 OFFICE O/P EST MOD 30 MIN: CPT | Mod: GT,,, | Performed by: OBSTETRICS & GYNECOLOGY

## 2020-04-02 NOTE — LETTER
April 4, 2020      Akosua Gandhi MD  [address]  [address]  [facility] [address]           [facility]  [address]  [facility] [address]  Phone: [phone]          Patient: Astrid Kaemmerling   MR Number: [id number]   YOB: 1985   Date of Visit: 4/2/2020       Dear Dr. Akosua Gandhi:    Thank you for referring Astrid Kaemmerling to me for evaluation. Attached you will find relevant portions of my assessment and plan of care.    If you have questions, please do not hesitate to call me. I look forward to following Astrid Kaemmerling along with you.    Sincerely,    Odin Snowden MD    Enclosure  CC:  No Recipients    If you would like to receive this communication electronically, please contact [email or web address] or [phone] to request more information on [employer] access.    For providers and/or their staff who would like to refer a patient to [facility], please contact us through our one-stop-shop provider referral line, [facility], at [phone].    If you feel you have received this communication in error or would no longer like to receive these types of communications, please e-mail [email or web address]

## 2020-04-04 NOTE — PROGRESS NOTES
MFM CONSULT    The patient location is: home  The chief complaint leading to consultation is: breech presentation, discuss external cephalic version  Visit type: Virtual visit with synchronous audio and video  Total time spent with patient: 25 min  Each patient to whom he or she provides medical services by telemedicine is:  (1) informed of the relationship between the physician and patient and the respective role of any other health care provider with respect to management of the patient; and (2) notified that he or she may decline to receive medical services by telemedicine and may withdraw from such care at any time.    Notes:     Seen in consultation at the request of Dr. Gandhi due to fetal breech presentation.  Ultrasound performed in her office earlier in the week demonstrated breech presentation.  Last official growth scan was on  fetus was at the 28th percentile.  Amniotic fluid volume was normal and placenta was posterior.  Pregnancy overall has been uncomplicated.    She has a history of asthma for which she takes albuterol as well as an inhaled corticosteroid.    We discussed external cephalic version, how the procedure is performed, the risks and benefits.  I discussed that given the fetal size, the placental position, and normal amniotic fluid volume, I would estimate that the success rate would be approximately 70% or higher.  I explained that there is approximately a 5 to 10% chance of needing emergent delivery due to fetal bradycardia.  We also discussed that there is a possibility that the fetus could turn to a vertex position but then revert to breech or other lie.  In addition even if the fetus turned to vertex, there is no guarantee of a vaginal delivery.    Because of her underlying asthma, she is very concerned about possible exposure to the corona virus and we discussed the pros and cons of an attempted external cephalic version versus a scheduled  delivery.    She and her   were going to discuss things further in consider all of their options.  Once they make a decision, they will let doctors we and I know how they wish to proceed.  If she desires to proceed with the version we will get it done prior to 38 weeks if possible.

## 2020-04-06 ENCOUNTER — TELEPHONE (OUTPATIENT)
Dept: OBSTETRICS AND GYNECOLOGY | Facility: CLINIC | Age: 35
End: 2020-04-06

## 2020-04-06 RX ORDER — OXYTOCIN/RINGER'S LACTATE 30/500 ML
334 PLASTIC BAG, INJECTION (ML) INTRAVENOUS ONCE
Status: CANCELLED | OUTPATIENT
Start: 2020-04-06 | End: 2020-04-06

## 2020-04-06 NOTE — TELEPHONE ENCOUNTER
----- Message from Akosua Gandhi MD sent at 4/6/2020 10:07 AM CDT -----  [facility]

## 2020-04-06 NOTE — TELEPHONE ENCOUNTER
order set entered for  at 0730    I tried to call the pt.  Left VM that the order is entered for  but L&D will call her with further instruction.

## 2020-04-06 NOTE — H&P
History and Physical       Obstetrics - Planned           Subjective:       Astrid Kaemmerling is a 34 y.o.  female with IUP at 39 weeks gestation who presents for primarycesarean section on 2020 due to breech presentation.  JOSSELYN and Dr. Snowden counseled about risks and benefits of external cephalic version versus .  Patient desires the latter.  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  Fetal genetic testing negative for Fadi-Sachs and Fragile X.    Patient denies contractions, denies vaginal bleeding, denies LOF.   Fetal Movement: normal.     PMHx:   Past Medical History:   Diagnosis Date    Asthma     Counseling for HPV (human papillomavirus) vaccination 2019    Not received (By the time Jeffrey received the Hpv Vaccines, she was too old)        PSHx:   Past Surgical History:   Procedure Laterality Date    FOOT SURGERY Right        All:   Review of patient's allergies indicates:   Allergen Reactions    Apple Other (See Comments)     Asthma symptoms     Banana     Nuts [tree nut]        Meds:   No medications prior to admission.       SH:   Social History     Socioeconomic History    Marital status:      Spouse name: Not on file    Number of children: Not on file    Years of education: Not on file    Highest education level: Not on file   Occupational History    Not on file   Social Needs    Financial resource strain: Not on file    Food insecurity:     Worry: Not on file     Inability: Not on file    Transportation needs:     Medical: Not on file     Non-medical: Not on file   Tobacco Use    Smoking status: Never Smoker    Smokeless tobacco: Never Used   Substance and Sexual Activity    Alcohol use: Not Currently     Comment: once a week, not with pregnancy    Drug use: Never    Sexual activity: Yes     Partners: Male     Birth control/protection: None     Comment: :      Lifestyle    Physical activity:     Days per week: Not on file      Minutes per session: Not on file    Stress: Not on file   Relationships    Social connections:     Talks on phone: Not on file     Gets together: Not on file     Attends [employer] service: Not on file     Active member of club or organization: Not on file     Attends meetings of clubs or organizations: Not on file     Relationship status: Not on file   Other Topics Concern    Not on file   Social History Narrative    Not on file       FH:   Family History   Problem Relation Age of Onset    Colon cancer Paternal Grandfather     Prostate cancer Paternal Grandfather         30's    Breast cancer Maternal Grandmother         70's    Stroke Maternal Grandmother     Heart attack Maternal Grandfather     Parkinsonism Paternal Grandmother     No Known Problems Father     No Known Problems Mother     No Known Problems Brother     No Known Problems Brother     Ovarian cancer Neg Hx        OBHx:   OB History    Para Term  AB Living   1 0 0 0 0 0   SAB TAB Ectopic Multiple Live Births   0 0 0 0 0      # Outcome Date GA Lbr Urbano/2nd Weight Sex Delivery Anes PTL Lv   1 Current               Obstetric Comments   Menarche at 12       Objective:       LMP 2019 (Exact Date)          General:   alert, appears stated age and cooperative   Lungs:   clear to auscultation bilaterally   Heart:   regular rate and rhythm, S1, S2 normal, no murmur, click, rub or gallop   Abdomen:  soft, non-tender; bowel sounds normal; no masses,  no organomegaly   Extremities negative edema, negative erythema     Cervical exam: 0/0/-5    Fetal Heart Tones: 136  1st Trimester (0-14 wks)     Test Value Date Time   ABORH - Soft  A POS  19 1500   ABSC      HGB  12.3 g/dL 19 1500   HCT  36.0 % 19 1500   MCV  91 fL 19 1500   Platelet Count  225 K/uL 19 1500   Rubella Immune Status  Reactive  19 1500   RPR  Non-reactive  19 1500   HBsAg  Negative  19 1500   HIV 1/2 Ab - Soft   Negative  19 1500   HIV 1/2 Ab      Chlamydia, Amplified  Not Detected  19 1451   GC, Amplified  Not Detected  19 1451   PAP  (See Report)  19 1209   Hemoglobin Bands      Urine Culture  No significant growth  19 1452   Cystic Fibrosis Mutation      Sequential Screen I      Integrated Screen Part 1      [id number] (External - must enter manually)      Glucose - Random  61 mg/dL 19 1500   TSH  1.309 uIU/mL 19 1500   2nd Trimester (14-28 wks)     Test Value Date Time   ABSC      HGB      HCT      MCV      Platelet Count      OB Glucose Screen  100 mg/dL 20 0900   Gluc Fast      Gluc 1 HR      Gluc 2 HR      Gluc 3 HR      Sequential Screen II      Integrated Screen II      Quad Screen      Quad Screen   (Prior to 2015)      Amniocentesis  (External - must enter manually)       3rd Trimester (28-40 wks)     Test Value Date Time   HGB  10.4 g/dL 20 0900   HCT  32.8 % 20 0900   MCV  97 fL 20 0900   Platelet Count  185 K/uL 20 0900   GBS  No Group B Streptococcus isolated  20 1043   RPR  Non-reactive  20 0947   HIV 1/2 Ab - Soft  Negative  20 0947   HIV 1/2 Ab      Chlamydia      GC            Assessment:     39 weeks gestation on 2020 admitted for a  delivery due to breech presentation  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     Post-Partum Hemorrhage risk - medium    Akosua Gandhi MD

## 2020-04-07 ENCOUNTER — ROUTINE PRENATAL (OUTPATIENT)
Dept: OBSTETRICS AND GYNECOLOGY | Facility: CLINIC | Age: 35
End: 2020-04-07
Payer: COMMERCIAL

## 2020-04-07 VITALS — SYSTOLIC BLOOD PRESSURE: 120 MMHG | DIASTOLIC BLOOD PRESSURE: 74 MMHG | BODY MASS INDEX: 31.33 KG/M2 | WEIGHT: 206 LBS

## 2020-04-07 DIAGNOSIS — O09.513 ADVANCED MATERNAL AGE, 1ST PREGNANCY, THIRD TRIMESTER: ICD-10-CM

## 2020-04-07 DIAGNOSIS — Z3A.38 38 WEEKS GESTATION OF PREGNANCY: ICD-10-CM

## 2020-04-07 PROCEDURE — 99999 PR PBB SHADOW E&M-EST. PATIENT-LVL II: CPT | Mod: PBBFAC,,, | Performed by: OBSTETRICS & GYNECOLOGY

## 2020-04-07 PROCEDURE — 0502F PR SUBSEQUENT PRENATAL CARE: ICD-10-PCS | Mod: CPTII,S$GLB,, | Performed by: OBSTETRICS & GYNECOLOGY

## 2020-04-07 PROCEDURE — 0502F SUBSEQUENT PRENATAL CARE: CPT | Mod: CPTII,S$GLB,, | Performed by: OBSTETRICS & GYNECOLOGY

## 2020-04-07 PROCEDURE — 99999 PR PBB SHADOW E&M-EST. PATIENT-LVL II: ICD-10-PCS | Mod: PBBFAC,,, | Performed by: OBSTETRICS & GYNECOLOGY

## 2020-04-07 NOTE — PROGRESS NOTES
No complaints. Lots of fetal movement. Reviewed FMC. Following CDC guidelines re: COVID-19. Will have  next week for ira breech. Is aware that L&D will call her to let her know when to come in. Gave labor/bleeding precautions. Patient has her consents to bring with her. Orders and H&P were done by Dr. Gandhi.

## 2020-04-08 ENCOUNTER — PATIENT MESSAGE (OUTPATIENT)
Dept: OBSTETRICS AND GYNECOLOGY | Facility: CLINIC | Age: 35
End: 2020-04-08

## 2020-04-13 ENCOUNTER — ANESTHESIA EVENT (OUTPATIENT)
Dept: OBSTETRICS AND GYNECOLOGY | Facility: OTHER | Age: 35
End: 2020-04-13
Payer: COMMERCIAL

## 2020-04-13 ENCOUNTER — TELEPHONE (OUTPATIENT)
Dept: OBSTETRICS AND GYNECOLOGY | Facility: CLINIC | Age: 35
End: 2020-04-13

## 2020-04-13 NOTE — TELEPHONE ENCOUNTER
Reassured her she is scheduled for her  tomorrow at 7:30, should plan to be there at 5:30am.  Aware nothing to eat after midnight, can drink water until she leaves her house.  Advised someone may call her today if plans have changed.

## 2020-04-14 ENCOUNTER — ANESTHESIA (OUTPATIENT)
Dept: OBSTETRICS AND GYNECOLOGY | Facility: OTHER | Age: 35
End: 2020-04-14
Payer: COMMERCIAL

## 2020-04-14 ENCOUNTER — HOSPITAL ENCOUNTER (INPATIENT)
Facility: OTHER | Age: 35
LOS: 2 days | Discharge: HOME | End: 2020-04-16
Attending: OBSTETRICS & GYNECOLOGY | Admitting: OBSTETRICS & GYNECOLOGY
Payer: COMMERCIAL

## 2020-04-14 DIAGNOSIS — Z34.90 TERM PREGNANCY: ICD-10-CM

## 2020-04-14 DIAGNOSIS — Z98.891 STATUS POST PRIMARY LOW TRANSVERSE CESAREAN SECTION: Primary | ICD-10-CM

## 2020-04-14 LAB
ABO + RH BLD: NORMAL
BASOPHILS # BLD AUTO: 0.02 K/UL (ref 0–0.2)
BASOPHILS NFR BLD: 0.2 % (ref 0–1.9)
BLD GP AB SCN CELLS X3 SERPL QL: NORMAL
DIFFERENTIAL METHOD: ABNORMAL
EOSINOPHIL # BLD AUTO: 0.2 K/UL (ref 0–0.5)
EOSINOPHIL NFR BLD: 2 % (ref 0–8)
ERYTHROCYTE [DISTWIDTH] IN BLOOD BY AUTOMATED COUNT: 13.5 % (ref 11.5–14.5)
HCT VFR BLD AUTO: 33.7 % (ref 37–48.5)
HGB BLD-MCNC: 11.4 G/DL (ref 12–16)
IMM GRANULOCYTES # BLD AUTO: 0.1 K/UL (ref 0–0.04)
IMM GRANULOCYTES NFR BLD AUTO: 1.1 % (ref 0–0.5)
LYMPHOCYTES # BLD AUTO: 1.5 K/UL (ref 1–4.8)
LYMPHOCYTES NFR BLD: 17.4 % (ref 18–48)
MCH RBC QN AUTO: 29.8 PG (ref 27–31)
MCHC RBC AUTO-ENTMCNC: 33.8 G/DL (ref 32–36)
MCV RBC AUTO: 88 FL (ref 82–98)
MONOCYTES # BLD AUTO: 0.7 K/UL (ref 0.3–1)
MONOCYTES NFR BLD: 7.3 % (ref 4–15)
NEUTROPHILS # BLD AUTO: 6.4 K/UL (ref 1.8–7.7)
NEUTROPHILS NFR BLD: 72 % (ref 38–73)
NRBC BLD-RTO: 0 /100 WBC
PLATELET # BLD AUTO: 158 K/UL (ref 150–350)
PMV BLD AUTO: 9.8 FL (ref 9.2–12.9)
RBC # BLD AUTO: 3.83 M/UL (ref 4–5.4)
SARS-COV-2 RDRP RESP QL NAA+PROBE: NEGATIVE
WBC # BLD AUTO: 8.86 K/UL (ref 3.9–12.7)

## 2020-04-14 PROCEDURE — 63600175 PHARM REV CODE 636 W HCPCS: Performed by: OBSTETRICS & GYNECOLOGY

## 2020-04-14 PROCEDURE — 99024 POSTOP FOLLOW-UP VISIT: CPT | Mod: ,,, | Performed by: OBSTETRICS & GYNECOLOGY

## 2020-04-14 PROCEDURE — 25000003 PHARM REV CODE 250: Performed by: OBSTETRICS & GYNECOLOGY

## 2020-04-14 PROCEDURE — 25000003 PHARM REV CODE 250: Performed by: STUDENT IN AN ORGANIZED HEALTH CARE EDUCATION/TRAINING PROGRAM

## 2020-04-14 PROCEDURE — S0020 INJECTION, BUPIVICAINE HYDRO: HCPCS | Performed by: STUDENT IN AN ORGANIZED HEALTH CARE EDUCATION/TRAINING PROGRAM

## 2020-04-14 PROCEDURE — 27200688 HC TRAY, SPINAL-HYPER/ ISOBARIC: Performed by: ANESTHESIOLOGY

## 2020-04-14 PROCEDURE — U0002 COVID-19 LAB TEST NON-CDC: HCPCS

## 2020-04-14 PROCEDURE — 37000008 HC ANESTHESIA 1ST 15 MINUTES: Performed by: OBSTETRICS & GYNECOLOGY

## 2020-04-14 PROCEDURE — 11000001 HC ACUTE MED/SURG PRIVATE ROOM

## 2020-04-14 PROCEDURE — 59510 CESAREAN DELIVERY: CPT | Mod: ,,, | Performed by: OBSTETRICS & GYNECOLOGY

## 2020-04-14 PROCEDURE — 86850 RBC ANTIBODY SCREEN: CPT

## 2020-04-14 PROCEDURE — 37000009 HC ANESTHESIA EA ADD 15 MINS: Performed by: OBSTETRICS & GYNECOLOGY

## 2020-04-14 PROCEDURE — 59510 PRA FULL ROUT OBSTE CARE,CESAREAN DELIV: ICD-10-PCS | Mod: ,,, | Performed by: ANESTHESIOLOGY

## 2020-04-14 PROCEDURE — 63600175 PHARM REV CODE 636 W HCPCS: Performed by: STUDENT IN AN ORGANIZED HEALTH CARE EDUCATION/TRAINING PROGRAM

## 2020-04-14 PROCEDURE — 71000039 HC RECOVERY, EACH ADD'L HOUR: Performed by: OBSTETRICS & GYNECOLOGY

## 2020-04-14 PROCEDURE — 59510 PR FULL ROUT OBSTE CARE,CESAREAN DELIV: ICD-10-PCS | Mod: ,,, | Performed by: OBSTETRICS & GYNECOLOGY

## 2020-04-14 PROCEDURE — 99024 PR POST-OP FOLLOW-UP VISIT: ICD-10-PCS | Mod: ,,, | Performed by: OBSTETRICS & GYNECOLOGY

## 2020-04-14 PROCEDURE — 36004724 HC OB OR TIME LEV III - 1ST 15 MIN: Performed by: OBSTETRICS & GYNECOLOGY

## 2020-04-14 PROCEDURE — S0028 INJECTION, FAMOTIDINE, 20 MG: HCPCS | Performed by: OBSTETRICS & GYNECOLOGY

## 2020-04-14 PROCEDURE — 51702 INSERT TEMP BLADDER CATH: CPT

## 2020-04-14 PROCEDURE — 71000033 HC RECOVERY, INTIAL HOUR: Performed by: OBSTETRICS & GYNECOLOGY

## 2020-04-14 PROCEDURE — 85025 COMPLETE CBC W/AUTO DIFF WBC: CPT

## 2020-04-14 PROCEDURE — 59510 CESAREAN DELIVERY: CPT | Mod: ,,, | Performed by: ANESTHESIOLOGY

## 2020-04-14 PROCEDURE — 36004725 HC OB OR TIME LEV III - EA ADD 15 MIN: Performed by: OBSTETRICS & GYNECOLOGY

## 2020-04-14 RX ORDER — SODIUM CITRATE AND CITRIC ACID MONOHYDRATE 334; 500 MG/5ML; MG/5ML
30 SOLUTION ORAL
Status: COMPLETED | OUTPATIENT
Start: 2020-04-14 | End: 2020-04-14

## 2020-04-14 RX ORDER — FAMOTIDINE 10 MG/ML
20 INJECTION INTRAVENOUS
Status: COMPLETED | OUTPATIENT
Start: 2020-04-14 | End: 2020-04-14

## 2020-04-14 RX ORDER — KETOROLAC TROMETHAMINE 30 MG/ML
30 INJECTION, SOLUTION INTRAMUSCULAR; INTRAVENOUS
Status: COMPLETED | OUTPATIENT
Start: 2020-04-14 | End: 2020-04-15

## 2020-04-14 RX ORDER — PRENATAL WITH FERROUS FUM AND FOLIC ACID 3080; 920; 120; 400; 22; 1.84; 3; 20; 10; 1; 12; 200; 27; 25; 2 [IU]/1; [IU]/1; MG/1; [IU]/1; MG/1; MG/1; MG/1; MG/1; MG/1; MG/1; UG/1; MG/1; MG/1; MG/1; MG/1
1 TABLET ORAL DAILY
Status: DISCONTINUED | OUTPATIENT
Start: 2020-04-14 | End: 2021-10-11

## 2020-04-14 RX ORDER — DEXAMETHASONE SODIUM PHOSPHATE 4 MG/ML
INJECTION, SOLUTION INTRA-ARTICULAR; INTRALESIONAL; INTRAMUSCULAR; INTRAVENOUS; SOFT TISSUE
Status: DISCONTINUED | OUTPATIENT
Start: 2020-04-14 | End: 2020-04-14

## 2020-04-14 RX ORDER — ALBUTEROL SULFATE 90 UG/1
2 AEROSOL, METERED RESPIRATORY (INHALATION) EVERY 6 HOURS PRN
Status: DISCONTINUED | OUTPATIENT
Start: 2020-04-14 | End: 2020-04-16 | Stop reason: HOSPADM

## 2020-04-14 RX ORDER — AMOXICILLIN 250 MG
1 CAPSULE ORAL NIGHTLY PRN
Status: DISCONTINUED | OUTPATIENT
Start: 2020-04-14 | End: 2021-10-11

## 2020-04-14 RX ORDER — DIPHENHYDRAMINE HCL 25 MG
25 CAPSULE ORAL EVERY 6 HOURS PRN
Status: DISCONTINUED | OUTPATIENT
Start: 2020-04-14 | End: 2021-10-11

## 2020-04-14 RX ORDER — DIPHENHYDRAMINE HYDROCHLORIDE 50 MG/ML
12.5 INJECTION INTRAMUSCULAR; INTRAVENOUS
Status: DISCONTINUED | OUTPATIENT
Start: 2020-04-14 | End: 2021-10-11

## 2020-04-14 RX ORDER — MORPHINE SULFATE 0.5 MG/ML
INJECTION, SOLUTION EPIDURAL; INTRATHECAL; INTRAVENOUS
Status: DISCONTINUED | OUTPATIENT
Start: 2020-04-14 | End: 2020-04-14

## 2020-04-14 RX ORDER — FENTANYL CITRATE 50 UG/ML
INJECTION, SOLUTION INTRAMUSCULAR; INTRAVENOUS
Status: DISCONTINUED | OUTPATIENT
Start: 2020-04-14 | End: 2020-04-14

## 2020-04-14 RX ORDER — IBUPROFEN 400 MG/1
800 TABLET ORAL
Status: DISCONTINUED | OUTPATIENT
Start: 2020-04-15 | End: 2021-10-11

## 2020-04-14 RX ORDER — PHENYLEPHRINE HYDROCHLORIDE 10 MG/ML
INJECTION INTRAVENOUS
Status: DISCONTINUED | OUTPATIENT
Start: 2020-04-14 | End: 2020-04-14

## 2020-04-14 RX ORDER — NALBUPHINE HYDROCHLORIDE 10 MG/ML
5 INJECTION, SOLUTION INTRAMUSCULAR; INTRAVENOUS; SUBCUTANEOUS ONCE AS NEEDED
Status: DISCONTINUED | OUTPATIENT
Start: 2020-04-14 | End: 2021-10-11

## 2020-04-14 RX ORDER — ONDANSETRON HYDROCHLORIDE 2 MG/ML
INJECTION, SOLUTION INTRAMUSCULAR; INTRAVENOUS
Status: DISCONTINUED | OUTPATIENT
Start: 2020-04-14 | End: 2020-04-14

## 2020-04-14 RX ORDER — MISOPROSTOL 200 UG/1
800 TABLET ORAL ONCE AS NEEDED
Status: DISCONTINUED | OUTPATIENT
Start: 2020-04-14 | End: 2021-10-11

## 2020-04-14 RX ORDER — ONDANSETRON 2 MG/ML
4 INJECTION INTRAMUSCULAR; INTRAVENOUS EVERY 6 HOURS PRN
Status: DISCONTINUED | OUTPATIENT
Start: 2020-04-14 | End: 2021-10-11

## 2020-04-14 RX ORDER — ACETAMINOPHEN 325 MG/1
650 TABLET ORAL
Status: DISCONTINUED | OUTPATIENT
Start: 2020-04-14 | End: 2021-10-11

## 2020-04-14 RX ORDER — SIMETHICONE 80 MG
1 TABLET,CHEWABLE ORAL EVERY 6 HOURS PRN
Status: DISCONTINUED | OUTPATIENT
Start: 2020-04-14 | End: 2021-10-11

## 2020-04-14 RX ORDER — DOCUSATE SODIUM 100 MG/1
200 CAPSULE, LIQUID FILLED ORAL 2 TIMES DAILY
Status: DISCONTINUED | OUTPATIENT
Start: 2020-04-14 | End: 2021-10-11

## 2020-04-14 RX ORDER — SODIUM CHLORIDE, SODIUM LACTATE, POTASSIUM CHLORIDE, CALCIUM CHLORIDE 600; 310; 30; 20 MG/100ML; MG/100ML; MG/100ML; MG/100ML
INJECTION, SOLUTION INTRAVENOUS CONTINUOUS
Status: DISCONTINUED | OUTPATIENT
Start: 2020-04-14 | End: 2021-10-11

## 2020-04-14 RX ORDER — OXYCODONE HYDROCHLORIDE 5 MG/1
10 TABLET ORAL EVERY 4 HOURS PRN
Status: DISCONTINUED | OUTPATIENT
Start: 2020-04-14 | End: 2021-10-11

## 2020-04-14 RX ORDER — CEFAZOLIN SODIUM 2 G/50ML
2 SOLUTION INTRAVENOUS
Status: DISCONTINUED | OUTPATIENT
Start: 2020-04-14 | End: 2021-10-11

## 2020-04-14 RX ORDER — HYDROCORTISONE 25 MG/G
CREAM TOPICAL 3 TIMES DAILY PRN
Status: DISCONTINUED | OUTPATIENT
Start: 2020-04-14 | End: 2021-10-11

## 2020-04-14 RX ORDER — BUPIVACAINE HYDROCHLORIDE 7.5 MG/ML
INJECTION, SOLUTION EPIDURAL; RETROBULBAR
Status: DISCONTINUED | OUTPATIENT
Start: 2020-04-14 | End: 2020-04-14

## 2020-04-14 RX ORDER — CEFAZOLIN SODIUM 1 G/3ML
INJECTION, POWDER, FOR SOLUTION INTRAMUSCULAR; INTRAVENOUS
Status: DISCONTINUED | OUTPATIENT
Start: 2020-04-14 | End: 2020-04-14

## 2020-04-14 RX ORDER — ACETAMINOPHEN 500 MG
1000 TABLET ORAL
Status: COMPLETED | OUTPATIENT
Start: 2020-04-14 | End: 2020-04-14

## 2020-04-14 RX ORDER — METHYLERGONOVINE MALEATE 0.2 MG/ML
200 INJECTION INTRAVENOUS ONCE AS NEEDED
Status: DISCONTINUED | OUTPATIENT
Start: 2020-04-14 | End: 2021-10-11

## 2020-04-14 RX ORDER — CARBOPROST TROMETHAMINE 250 UG/ML
250 INJECTION, SOLUTION INTRAMUSCULAR
Status: DISCONTINUED | OUTPATIENT
Start: 2020-04-14 | End: 2021-10-11

## 2020-04-14 RX ORDER — OXYTOCIN/RINGER'S LACTATE 30/500 ML
95 PLASTIC BAG, INJECTION (ML) INTRAVENOUS ONCE
Status: COMPLETED | OUTPATIENT
Start: 2020-04-14 | End: 2020-04-14

## 2020-04-14 RX ORDER — OXYCODONE HYDROCHLORIDE 5 MG/1
5 TABLET ORAL EVERY 4 HOURS PRN
Status: DISCONTINUED | OUTPATIENT
Start: 2020-04-14 | End: 2021-10-11

## 2020-04-14 RX ORDER — ONDANSETRON 8 MG/1
8 TABLET, ORALLY DISINTEGRATING ORAL EVERY 8 HOURS PRN
Status: DISCONTINUED | OUTPATIENT
Start: 2020-04-14 | End: 2021-10-11

## 2020-04-14 RX ADMIN — PHENYLEPHRINE HYDROCHLORIDE 50 MCG/MIN: 10 INJECTION INTRAVENOUS at 08:04

## 2020-04-14 RX ADMIN — CEFAZOLIN 2 G: 330 INJECTION, POWDER, FOR SOLUTION INTRAMUSCULAR; INTRAVENOUS at 08:04

## 2020-04-14 RX ADMIN — KETOROLAC TROMETHAMINE 30 MG: 30 INJECTION, SOLUTION INTRAMUSCULAR at 11:04

## 2020-04-14 RX ADMIN — SODIUM CITRATE AND CITRIC ACID MONOHYDRATE 30 ML: 500; 334 SOLUTION ORAL at 07:04

## 2020-04-14 RX ADMIN — FAMOTIDINE 20 MG: 10 INJECTION INTRAVENOUS at 07:04

## 2020-04-14 RX ADMIN — Medication 3 UNITS: at 09:04

## 2020-04-14 RX ADMIN — SODIUM CHLORIDE, SODIUM LACTATE, POTASSIUM CHLORIDE, AND CALCIUM CHLORIDE: 600; 310; 30; 20 INJECTION, SOLUTION INTRAVENOUS at 09:04

## 2020-04-14 RX ADMIN — KETOROLAC TROMETHAMINE 30 MG: 30 INJECTION, SOLUTION INTRAMUSCULAR at 10:04

## 2020-04-14 RX ADMIN — PHENYLEPHRINE HYDROCHLORIDE 100 MCG: 10 INJECTION INTRAVENOUS at 09:04

## 2020-04-14 RX ADMIN — DOCUSATE SODIUM 200 MG: 100 CAPSULE, LIQUID FILLED ORAL at 11:04

## 2020-04-14 RX ADMIN — ACETAMINOPHEN 650 MG: 325 TABLET ORAL at 02:04

## 2020-04-14 RX ADMIN — DEXAMETHASONE SODIUM PHOSPHATE 4 MG: 4 INJECTION, SOLUTION INTRAMUSCULAR; INTRAVENOUS at 08:04

## 2020-04-14 RX ADMIN — PHENYLEPHRINE HYDROCHLORIDE 25 MCG/MIN: 10 INJECTION INTRAVENOUS at 09:04

## 2020-04-14 RX ADMIN — SODIUM CHLORIDE, SODIUM LACTATE, POTASSIUM CHLORIDE, AND CALCIUM CHLORIDE: 600; 310; 30; 20 INJECTION, SOLUTION INTRAVENOUS at 08:04

## 2020-04-14 RX ADMIN — FENTANYL CITRATE 10 MCG: 50 INJECTION, SOLUTION INTRAMUSCULAR; INTRAVENOUS at 08:04

## 2020-04-14 RX ADMIN — ACETAMINOPHEN 650 MG: 325 TABLET ORAL at 11:04

## 2020-04-14 RX ADMIN — Medication 0.1 MG: at 08:04

## 2020-04-14 RX ADMIN — KETOROLAC TROMETHAMINE 30 MG: 30 INJECTION, SOLUTION INTRAMUSCULAR at 04:04

## 2020-04-14 RX ADMIN — BUPIVACAINE HYDROCHLORIDE 1.6 ML: 7.5 INJECTION, SOLUTION EPIDURAL; RETROBULBAR at 08:04

## 2020-04-14 RX ADMIN — SODIUM CHLORIDE, SODIUM LACTATE, POTASSIUM CHLORIDE, AND CALCIUM CHLORIDE: 600; 310; 30; 20 INJECTION, SOLUTION INTRAVENOUS at 07:04

## 2020-04-14 RX ADMIN — Medication 6 UNITS: at 09:04

## 2020-04-14 RX ADMIN — ONDANSETRON 4 MG: 2 INJECTION, SOLUTION INTRAMUSCULAR; INTRAVENOUS at 08:04

## 2020-04-14 RX ADMIN — ACETAMINOPHEN 1000 MG: 500 TABLET ORAL at 07:04

## 2020-04-14 NOTE — PLAN OF CARE
VSS. Breast feeding independently. Xiong in place. Pain well controlled with scheduled pain meds. Dressing dry and intact with no signs of infection. FOB at bedside. No acute events this shift. No additional needs at this time.

## 2020-04-14 NOTE — LACTATION NOTE
04/14/20 1345   Maternal Assessment   Breast Shape Bilateral:;tubular   Breast Density Bilateral:;soft   Areola Bilateral:;elastic   Nipples Bilateral:;everted   Maternal Infant Feeding   Maternal Emotional State assist needed   Infant Positioning clutch/football;cross-cradle   Signs of Milk Transfer infant jaw motion present   Pain with Feeding yes   Pain Location nipple, left   Pain Description soreness   Latch Assistance yes   Basic lactation education reviewed. Assisted with position and latch, baby very tense (breech delivery) assisted with FB position, mother's nipple red, tender, baby wants to slip to tip. Encouraged to break suction and relatch deeper to ensure baby keeps deep latch. Discussed feeding on cue or at least every 3hrs. Encouraged hand expression after each feeding. Baby nurses well with some stimulation. RN at bedside. LC number on board.

## 2020-04-14 NOTE — DISCHARGE INSTRUCTIONS
Breastfeeding Discharge Instructions       Feed the baby at the earliest sign of hunger or comfort  o Hands to mouth, sucking motions  o Rooting or searching for something to suck on  o Dont wait for crying - it is a sign of distress     The feedings may be 8-12 times per 24hrs and will not follow a schedule   Avoid pacifiers and bottles for the first 4 weeks   Alternate the breast you start the feeding with, or start with the breast that feels the fullest   Switch breasts when the baby takes himself off the breast or falls asleep   Keep offering breasts until the baby looks full, no longer gives hunger signs, and stays asleep when placed on his back in the crib   If the baby is sleepy and wont wake for a feeding, put the baby skin-to-skin dressed in a diaper against the mothers bare chest   Sleep near your baby   The baby should be positioned and latched on to the breast correctly  o Chest-to-chest, chin in the breast  o Babys lips are flipped outward  o Babys mouth is stretched open wide like a shout  o Babys sucking should feel like tugging to the mother  - The baby should be drinking at the breast:  o You should hear swallowing or gulping throughout the feeding  o You should see milk on the babys lips when he comes off the breast  o Your breasts should be softer when the baby is finished feeding  o The baby should look relaxed at the end of feedings  o After the 4th day and your milk is in:  o The babys poop should turn bright yellow and be loose, watery, and seedy  o The baby should have at least 3-4 poops the size of the palm of your hand per day  o The baby should have at least 5-6 wet diapers per day  o The urine should be light yellow in color  You should drink when you are thirsty and eat a healthy diet when you are    hungry.     Take naps to get the rest you need.   Take medications and/or drink alcohol only with permission of your obstetrician    or the babys pediatrician.  You can  also call the [facility] Risk Center,   ([phone]), Monday-Friday, 8am-5pm Central time, to get the most   up-to-date evidence-based information on the use of medications during   pregnancy and breastfeeding.      The baby should be examined by a pediatrician at 3-5 days of age.  Once your   milk comes in, the baby should be gaining at least ½ - 1oz each day and should be back to birthweight no later than 10-14 days of age.          Community Resources    [facility] Breastfeeding Warmline: [phone]   Local [facility] clinics: provide incentives and breastpumps to eligible mothers  [employer] (LLLI):  mother-to-mother support group website        www.[employer]  Local La Leche League mother-to-mother support groups:        www.[employer]        [facility] Leche [facility]   Dr. Franky Rodriguez website for latch videos and general information:        www.breastfeedinginc.ca  [facility] Risk [facility] is a call center that provides information about the safety of taking medications while breastfeeding.  Call [phone], M-F, 8am-5pm, CT.  International Lactation Consultant Association provides resources for assistance:        www.ilca.org  [place] Breastfeeding Coalition provides informationand resources for parents  and the community    www.LaBreastfeedingSupport.org     Ramonita Hoffman is a mom-to-mom support group:                             www.DejamorbearDiagnose.me.com//breastfeedng-support/  [employer] for Healthy Babies:  4-722-794-BABY(4133)  [employer]: a breastfeeding support group for women of color, [phone]

## 2020-04-14 NOTE — L&D DELIVERY NOTE
[facility]   Section   Operative Note    SUMMARY      Section Procedure Note    Procedure:   1. Primary  Section via Pfannenstiel skin incision    Indications:   1. malpresentation: breech presentation    Pre-operative Diagnosis:   1. IUP at 39 week 0 day pregnancy  2. Breech presentation   3. AMA   4. +carrier FMR1   5. Carrier of Fadi-Sachs    Post-operative Diagnosis:   same    Surgeon: Akosua Gandhi MD    Assistants: Dorinda Grande MD-PGY2    Anesthesia: Spinal anesthesia    Findings:    1. Single viable  male infant, with APGARS 9/9, weight 3970g.   2. Normal appearing uterus, ovaries, and fallopian tubes.  3. Normal placenta    Estimated Blood Loss:  645 mL           Total IV Fluids: 3500 mL     UOP: 200 mL    Specimens: none    PreOp CBC:   Lab Results   Component Value Date    WBC 8.86 2020    HGB 11.4 (L) 2020    HCT 33.7 (L) 2020    MCV 88 2020     2020                     Complications:  None; patient tolerated the procedure well.           Disposition: PACU - hemodynamically stable.           Condition: stable    Procedure Details   The patient was seen in the Holding Room. The risks, benefits, complications, treatment options, and expected outcomes were discussed with the patient.  The patient concurred with the proposed plan, giving informed consent.  The patient was taken to Operating Room, identified as Astrid Kaemmerling and the procedure verified as primary  Delivery. A Time Out was held and the above information confirmed.    After induction of anesthesia, the patient was prepped and draped in the usual sterile manner while placed in a dorsal supine position with a left lateral tilt.  A stover catheter was also placed per nursing. Preoperative antibiotics Ancef 2 g was administered. An allis test was performed confirming adequate anesthesia.  A Pfannenstiel incision was made and carried down  through the subcutaneous tissue to the fascia. Fascial incision was made and extended transversely. The fascia was grasped with [facility] clamps and  from the underlying rectus tissue superiorly and inferiorly. The peritoneum was identified, found to be free of adherent bowel, and entered sharply using hemostats and Metzenbaum scissor. Peritoneal incision was extended longitudinally. The vesico-uterine peritoneum was identified, and bladder blade was inserted. The vesico-uterine peritoneum was incised transversely and the bladder flap was bluntly freed from the lower uterine segment. The bladder blade was reinserted to keep the bladder out of the operative field. A low transverse uterine incision was made with knife and extended with gentle cephalocaudal traction. The amniotic sac was ruptured with entry into the uterus and the infant was noted to be in breech presentation. The fetal hips were brought to the incision and elevated out of the pelvis. The patient delivered a single viable male infant without difficulty.  Infant weighed 3970 grams with Apgar scores of 9/9 at one and five minutes respectively. After the umbilical cord was clamped and cut, cord blood was obtained for evaluation. The placenta was removed intact, appeared normal, and was discarded. The uterus was left in situ. The uterine incision was closed with running locked sutures of 1 chromic with overlying imbricating layer. Additional hemostatic suture of vicryl was required along left incisional angle. Hemostasis was observed. ncision was reinspected and good hemostasis was noted. The abdominal cavity was irrigated to remove clots. The peritoneum and muscle was reapproximated with 2-0 vicryl suture. The fascia was then reapproximated with running sutures of vicryl. The subcutaneous fat was reapproximated with 2-0 plain gut, and skin was reapproximated with 4-0 monocryl.    Instrument, sponge, and needle counts were correct prior the  abdominal closure and at the conclusion of the case.     Pt tolerated procedure well and was in stable condition after the procedure.      **NOTE: This patient IS a candidate for trial of labor after  delivery**    Dorinda Grande MD  OBGYN, PGY-2               Delivery Information for Boy Astrid Kaemmerling    Birth information:  YOB: 2022   Time of birth: 9:01 AM   Sex: male   Head Delivery Date/Time: 2020  9:01 AM   Delivery type: , Low Transverse   Gestational Age: 39w0d    Delivery Providers    Delivering clinician:  Akosua Gandhi MD   Provider Role    Dorinda Grande MD Resident    Beatrice Palencia, CELE Circulator    Lavonne Smith RN Circulator    Claire Larson Surgical Tech            Measurements    Weight:  3970 g  Length:  50.8 cm  Head circumference:  35.6 cm  Chest circumference:  36.8 cm         Apgars    Living status:  Living  Apgars:   1 min.:   5 min.:   10 min.:   15 min.:   20 min.:     Skin color:   1  1       Heart rate:   2  2       Reflex irritability:   2  2       Muscle tone:   2  2       Respiratory effort:   2  2       Total:   9  9       Apgars assigned by:  NICU         Operative Delivery    Forceps attempted?:  No  Vacuum extractor attempted?:  No         Shoulder Dystocia    Shoulder dystocia present?:  No           Presentation    Presentation:  Joe Breech           Interventions/Resuscitation    Method:  NICU Attended       Cord    Vessels:  3 vessels  Complications:  None  Delayed Cord Clamping?:  No  Cord Clamped Date/Time:  2020  9:01 AM  Cord Blood Disposition:  Sent with Baby  Gases Sent?:  No  Stem Cell Collection (by MD):  No       Placenta    Placenta delivery date/time:  2020 0904  Placenta removal:  Manual removal  Placenta appearance:  Intact  Placenta disposition:  discarded           Labor Events:       labor:       Labor Onset Date/Time:         Dilation Complete Date/Time:         Start Pushing  Date/Time:         Start Pushing Date/Time:       Rupture Date/Time:              Rupture type:           Fluid Amount:        Fluid Color:        Fluid Odor:        Membrane Status:                 steroids:       Antibiotics given for GBS:       Induction:       Indications for induction:        Augmentation:       Indications for augmentation:       Labor complications: None     Additional complications:          Cervical ripening:                     Delivery:      Episiotomy:       Indication for Episiotomy:       Perineal Lacerations:   Repaired:      Periurethral Laceration:   Repaired:     Labial Laceration:   Repaired:     Sulcus Laceration:   Repaired:     Vaginal Laceration:   Repaired:     Cervical Laceration:   Repaired:     Repair suture:       Repair # of packets:       Last Value - EBL - Nursing (mL): 0     Sum - EBL - Nursing (mL): 0     Last Value - EBL - Anesthesia (mL):      Calculated QBL (mL): 645      Vaginal Sweep Performed: No     Surgicount Correct:         Other providers:       Anesthesia    Method:  Spinal          Details (if applicable):  Trial of Labor No    Categorization: Primary    Priority: Routine   Indications for : Breech   Incision Type: low transverse     Additional  information:  Forceps:    Vacuum:    Breech:    Observed anomalies    Other (Comments):

## 2020-04-14 NOTE — ANESTHESIA PROCEDURE NOTES
Spinal    Diagnosis: IUP  Patient location during procedure: OR  Start time: 4/14/2020 8:36 AM  Timeout: 4/14/2020 8:35 AM  End time: 4/14/2020 8:41 AM    Staffing  Authorizing Provider: Eugenie Chanel MD  Performing Provider: Eugenie Chanel MD    Staffing  Other anesthesia staff: Hunter Fisher MD  Preanesthetic Checklist  Completed: patient identified, site marked, surgical consent, pre-op evaluation, timeout performed, IV checked, risks and benefits discussed and monitors and equipment checked  Spinal Block  Patient position: sitting  Prep: ChloraPrep  Patient monitoring: heart rate, continuous pulse ox and frequent blood pressure checks  Approach: midline  Location: L3-4  Injection technique: single shot  CSF Fluid: clear free-flowing CSF  Needle  Needle type: pencil-tip   Needle gauge: 25 G  Needle length: 3.5 in  Additional Documentation: incremental injection, negative aspiration for heme and no paresthesia on injection  Needle localization: anatomical landmarks  Assessment  Sensory level: T5   Dermatomal levels determined by pinch or prick  Ease of block: moderate  Patient's tolerance of the procedure: comfortable throughout block and no complaints

## 2020-04-14 NOTE — LACTATION NOTE
This note was copied from a baby's chart.  Mother was taught hand expression of breastmilk/colostrum. She was instructed to:   Sit upright and lean forward, if possible.   When feasible, apply warm, wet compress over breasts for a few minutes.    Perform gentle breast massage.   Form a C with her hand and place it about 1 inch back from the areola with the nipple centered between her index finger and her thumb.   Press, compress, relax:  Using her finger and thumb, apply pressure in an inward direction toward the breast without stretching the tissue, compress the breast tissue between her finger and thumb, then relax her finger and thumb. Repeat process for a few minutes.   Rotate placement of finger and thumb on the breasts to facilitate emptying.   Collect expressed breastmilk/colostrum with a spoon or cup and feed immediately to the baby, if able.   If unable to feed immediately, place breastmilk/colostrum directly into a sterile storage container for later use. Place the babys breast milk label (with the date and time of collection and the names of mother's medications) on the container. Reviewed proper handling and storage of expressed breastmilk.   Patient effectively return demonstrated and verbalized understanding.

## 2020-04-14 NOTE — H&P
History and Physical       Obstetrics - Planned           Subjective:       Astrid Kaemmerling is a 34 y.o.  female with IUP at 39 weeks gestation who presents for primarycesarean section on 2020 due to breech presentation.  I and Dr. Snowden counseled about risks and benefits of external cephalic version versus .  Patient desires the latter.  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  Fetal genetic testing negative for Fadi-Sachs and Fragile X.    Patient denies contractions, denies vaginal bleeding, denies LOF.   Fetal Movement: normal.     PMHx:   Past Medical History:   Diagnosis Date    Asthma     Counseling for HPV (human papillomavirus) vaccination 2019    Not received (By the time Jeffrey received the Hpv Vaccines, she was too old)        PSHx:   Past Surgical History:   Procedure Laterality Date    FOOT SURGERY Right        All:   Review of patient's allergies indicates:   Allergen Reactions    Apple Other (See Comments)     Asthma symptoms     Banana     Nuts [tree nut]        Meds:   Medications Prior to Admission   Medication Sig Dispense Refill Last Dose    albuterol (PROVENTIL HFA) 90 mcg/actuation inhaler Inhale 2 puffs into the lungs every 6 (six) hours as needed for Wheezing. Rescue 18 g 3 Taking    beclomethasone (QVAR) 80 mcg/actuation Aero Inhale 1 puff into the lungs 2 (two) times daily. Controller 8.7 g 3 Taking    calcium carbonate (OS-TIMOTHY) 600 mg calcium (1,500 mg) Tab Take 600 mg by mouth once.   Taking    docosahexanoic acid (DHA PRENATAL ORAL) Take by mouth.   Taking    ferrous sulfate 325 (65 FE) MG EC tablet Take 325 mg by mouth 3 (three) times daily with meals.   Taking    fexofenadine (ALLEGRA) 60 MG tablet Take 60 mg by mouth once daily.   Taking    folic acid (FOLVITE) 800 MCG Tab Take 800 mcg by mouth once daily.   Taking    magnesium 30 mg Tab Take by mouth once.   Taking    pyridoxine, vitamin B6, (VITAMIN B-6) 100 MG  Tab Take 100 mg by mouth once daily.   Taking    vitamin D (VITAMIN D3) 1000 units Tab Take 1,000 Units by mouth once daily.   Taking       SH:   Social History     Socioeconomic History    Marital status:      Spouse name: Not on file    Number of children: Not on file    Years of education: Not on file    Highest education level: Not on file   Occupational History    Not on file   Social Needs    Financial resource strain: Not on file    Food insecurity:     Worry: Not on file     Inability: Not on file    Transportation needs:     Medical: Not on file     Non-medical: Not on file   Tobacco Use    Smoking status: Never Smoker    Smokeless tobacco: Never Used   Substance and Sexual Activity    Alcohol use: Not Currently     Comment: once a week, not with pregnancy    Drug use: Never    Sexual activity: Yes     Partners: Male     Birth control/protection: None     Comment: :      Lifestyle    Physical activity:     Days per week: Not on file     Minutes per session: Not on file    Stress: Not on file   Relationships    Social connections:     Talks on phone: Not on file     Gets together: Not on file     Attends Hoahaoism service: Not on file     Active member of club or organization: Not on file     Attends meetings of clubs or organizations: Not on file     Relationship status: Not on file   Other Topics Concern    Not on file   Social History Narrative    Not on file       FH:   Family History   Problem Relation Age of Onset    Colon cancer Paternal Grandfather     Prostate cancer Paternal Grandfather         30's    Breast cancer Maternal Grandmother         70's    Stroke Maternal Grandmother     Heart attack Maternal Grandfather     Parkinsonism Paternal Grandmother     No Known Problems Father     No Known Problems Mother     No Known Problems Brother     No Known Problems Brother     Ovarian cancer Neg Hx        OBHx:   OB History    Para Term  AB  Living   1 0 0 0 0 0   SAB TAB Ectopic Multiple Live Births   0 0 0 0 0      # Outcome Date GA Lbr Urbano/2nd Weight Sex Delivery Anes PTL Lv   1 Current               Obstetric Comments   Menarche at 12       Objective:       LMP 07/01/2019 (Exact Date)   Breastfeeding? No          General:   alert, appears stated age and cooperative   Lungs:   clear to auscultation bilaterally   Heart:   regular rate and rhythm, S1, S2 normal, no murmur, click, rub or gallop   Abdomen:  soft, non-tender; bowel sounds normal; no masses,  no organomegaly   Extremities negative edema, negative erythema         Fetal Heart Tones: 136  1st Trimester (0-14 wks)     Test Value Date Time   ABORH - Soft  A POS  08/28/19 1500   ABSC      HGB  12.3 g/dL 08/28/19 1500   HCT  36.0 % 08/28/19 1500   MCV  91 fL 08/28/19 1500   Platelet Count  225 K/uL 08/28/19 1500   Rubella Immune Status  Reactive  08/28/19 1500   RPR  Non-reactive  08/28/19 1500   HBsAg  Negative  08/28/19 1500   HIV 1/2 Ab - Soft  Negative  08/28/19 1500   HIV 1/2 Ab      Chlamydia, Amplified  Not Detected  08/28/19 1451   GC, Amplified  Not Detected  08/28/19 1451   PAP  (See Report)  05/14/19 1209   Hemoglobin Bands      Urine Culture  No significant growth  08/28/19 1452   Cystic Fibrosis Mutation      Sequential Screen I      Integrated Screen Part 1      [id number] (External - must enter manually)      Glucose - Random  61 mg/dL 08/28/19 1500   TSH  1.309 uIU/mL 08/28/19 1500   2nd Trimester (14-28 wks)     Test Value Date Time   ABSC      HGB      HCT      MCV      Platelet Count      OB Glucose Screen  100 mg/dL 02/07/20 0900   Gluc Fast      Gluc 1 HR      Gluc 2 HR      Gluc 3 HR      Sequential Screen II      Integrated Screen II      Quad Screen      Quad Screen   (Prior to 02/13/2015)      Amniocentesis  (External - must enter manually)       3rd Trimester (28-40 wks)     Test Value Date Time   HGB  10.4 g/dL 02/07/20 0900   HCT  32.8 % 02/07/20 0900   MCV  97 fL  20 0900   Platelet Count  185 K/uL 20 0900   GBS  No Group B Streptococcus isolated  20 1043   RPR  Non-reactive  20 0947   HIV 1/2 Ab - Soft  Negative  20 0947   HIV 1/2 Ab      Chlamydia      GC            Assessment:     39 weeks gestation on 2020 admitted for a  delivery due to breech presentation  Maternal carrier of Fragile X and Fadi-Sachs - fetal testing negative  Premature ovarian insufficiency - conceived on her own    Plan:   1. Malpresentation   - Admit to Labor and Delivery unit  - Consents for delivery including vaginal or  section and blood transfusion signed and to chart  - presentation confirmed via bedside ultrasound   - Risks, benefits, alternatives and possible complications have been discussed in detail with the patient.     2. Asthma   - patient takes QVar at home  - no symptoms currently   - continue home meds inpatient       Post-Partum Hemorrhage risk - medium    Dorinda Grande MD  OBGYN, PGY-2

## 2020-04-14 NOTE — ANESTHESIA PREPROCEDURE EVALUATION
2020  Astrid Kaemmerling is a 34 y.o., female.  Astrid Kaemmerling is a 34 y.o. female with IUP at 39 weeks gestation who presents for primarycesarean section due to breech presentation.   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 Fetus negative.    OB History    Para Term  AB Living   1 0 0 0 0 0   SAB TAB Ectopic Multiple Live Births   0 0 0 0 0      # Outcome Date GA Lbr Urbano/2nd Weight Sex Delivery Anes PTL Lv   1 Current               Obstetric Comments   Menarche at 12       Wt Readings from Last 1 Encounters:   20 0649 94 kg (207 lb 3.7 oz)       BP Readings from Last 3 Encounters:   20 117/61   20 120/74   20 122/72       Patient Active Problem List   Diagnosis    DUB (dysfunctional uterine bleeding)    Hirsutism    Breech presentation, antepartum    Term pregnancy       Past Surgical History:   Procedure Laterality Date    FOOT SURGERY Right        Social History     Socioeconomic History    Marital status:      Spouse name: Not on file    Number of children: Not on file    Years of education: Not on file    Highest education level: Not on file   Occupational History    Not on file   Social Needs    Financial resource strain: Not on file    Food insecurity:     Worry: Not on file     Inability: Not on file    Transportation needs:     Medical: Not on file     Non-medical: Not on file   Tobacco Use    Smoking status: Never Smoker    Smokeless tobacco: Never Used   Substance and Sexual Activity    Alcohol use: Not Currently     Comment: once a week, not with pregnancy    Drug use: Never    Sexual activity: Yes     Partners: Male     Birth control/protection: None     Comment: :      Lifestyle    Physical activity:     Days per week: Not on file     Minutes per session: Not on file    Stress: Not on file    Relationships    Social connections:     Talks on phone: Not on file     Gets together: Not on file     Attends Denominational service: Not on file     Active member of club or organization: Not on file     Attends meetings of clubs or organizations: Not on file     Relationship status: Not on file   Other Topics Concern    Not on file   Social History Narrative    Not on file         Chemistry        Component Value Date/Time     05/23/2019 0938    K 4.1 05/23/2019 0938     05/23/2019 0938    CO2 24 05/23/2019 0938    BUN 11 05/23/2019 0938    CREATININE 0.7 05/23/2019 0938    GLU 61 (L) 08/28/2019 1500        Component Value Date/Time    CALCIUM 9.2 05/23/2019 0938    ALKPHOS 37 (L) 05/23/2019 0938    AST 21 05/23/2019 0938    ALT 22 05/23/2019 0938    BILITOT 0.7 05/23/2019 0938    ESTGFRAFRICA >60 05/23/2019 0938    EGFRNONAA >60 05/23/2019 0938            Lab Results   Component Value Date    WBC 10.22 02/07/2020    HGB 10.4 (L) 02/07/2020    HCT 32.8 (L) 02/07/2020    MCV 97 02/07/2020     02/07/2020       No results for input(s): PT, INR, PROTIME, APTT in the last 72 hours.              Anesthesia Evaluation    I have reviewed the Patient Summary Reports.    I have reviewed the Nursing Notes.   I have reviewed the Medications.     Review of Systems  Anesthesia Hx:  No problems with previous Anesthesia  History of prior surgery of interest to airway management or planning: Denies Family Hx of Anesthesia complications.   Denies Personal Hx of Anesthesia complications.   Social:  Non-Smoker    Hematology/Oncology:  Hematology Normal   Oncology Normal     EENT/Dental:EENT/Dental Normal   Cardiovascular:  Cardiovascular Normal     Pulmonary:   Asthma    Renal/:  Renal/ Normal     Hepatic/GI:  Hepatic/GI Normal    Musculoskeletal:  Musculoskeletal Normal    Neurological:  Neurology Normal    Endocrine:  Endocrine Normal    Dermatological:  Skin Normal    Psych:  Psychiatric Normal            Physical Exam  General:  Well nourished    Airway/Jaw/Neck:  Airway Findings: Mouth Opening: Normal Tongue: Normal  General Airway Assessment: Adult  Mallampati: II  TM Distance: Normal, at least 6 cm  Jaw/Neck Findings:  Neck ROM: Normal ROM     Eyes/Ears/Nose:  EYES/EARS/NOSE FINDINGS: Normal   Dental:  Dental Findings: In tact   Chest/Lungs:  Chest/Lungs Findings: Clear to auscultation     Heart/Vascular:  Heart Findings: Rate: Normal  Rhythm: Regular Rhythm  Heart murmur: negative       Mental Status:  Mental Status Findings:  Cooperative, Alert and Oriented         Anesthesia Plan  Type of Anesthesia, risks & benefits discussed:  Anesthesia Type:  general, epidural, CSE, spinal  Patient's Preference:   Intra-op Monitoring Plan: standard ASA monitors  Intra-op Monitoring Plan Comments:   Post Op Pain Control Plan: multimodal analgesia, IV/PO Opioids PRN and per primary service following discharge from PACU  Post Op Pain Control Plan Comments:   Induction:   IV  Beta Blocker:         Informed Consent: Patient understands risks and agrees with Anesthesia plan.  Questions answered. Anesthesia consent signed with patient.  ASA Score: 2     Day of Surgery Review of History & Physical:            Ready For Surgery From Anesthesia Perspective.

## 2020-04-14 NOTE — TRANSFER OF CARE
"Anesthesia Transfer of Care Note    Patient: Astrid Kaemmerling    Procedure(s) Performed: Procedure(s) (LRB):   SECTION (N/A)    Patient location: Labor and Delivery    Anesthesia Type: spinal    Transport from OR: Transported from OR on room air with adequate spontaneous ventilation    Post pain: adequate analgesia    Post assessment: no apparent anesthetic complications and tolerated procedure well    Post vital signs: stable    Level of consciousness: awake, alert and oriented    Nausea/Vomiting: no nausea/vomiting    Complications: none    Transfer of care protocol was followed      Last vitals:   Visit Vitals  /61 (BP Location: Left arm, Patient Position: Lying)   Pulse 94   Temp 36.6 °C (97.9 °F) (Oral)   Resp 18   Ht 5' 8" (1.727 m)   Wt 94 kg (207 lb 3.7 oz)   LMP 2019 (Exact Date)   SpO2 100%   Breastfeeding? No   BMI 31.51 kg/m²     "

## 2020-04-15 PROBLEM — Z98.891 STATUS POST PRIMARY LOW TRANSVERSE CESAREAN SECTION: Status: ACTIVE | Noted: 2020-04-15

## 2020-04-15 PROBLEM — Z34.90 TERM PREGNANCY: Status: RESOLVED | Noted: 2020-04-14 | Resolved: 2020-04-15

## 2020-04-15 PROCEDURE — 11000001 HC ACUTE MED/SURG PRIVATE ROOM

## 2020-04-15 PROCEDURE — 25000003 PHARM REV CODE 250: Performed by: OBSTETRICS & GYNECOLOGY

## 2020-04-15 PROCEDURE — 63600175 PHARM REV CODE 636 W HCPCS: Performed by: OBSTETRICS & GYNECOLOGY

## 2020-04-15 RX ORDER — IBUPROFEN 600 MG/1
600 TABLET ORAL EVERY 6 HOURS PRN
Qty: 30 TABLET | Refills: 0 | Status: SHIPPED | OUTPATIENT
Start: 2020-04-15 | End: 2020-05-26

## 2020-04-15 RX ORDER — OXYCODONE AND ACETAMINOPHEN 5; 325 MG/1; MG/1
1 TABLET ORAL EVERY 6 HOURS PRN
Qty: 20 TABLET | Refills: 0 | Status: SHIPPED | OUTPATIENT
Start: 2020-04-15 | End: 2020-05-26

## 2020-04-15 RX ADMIN — ACETAMINOPHEN 650 MG: 325 TABLET ORAL at 04:04

## 2020-04-15 RX ADMIN — KETOROLAC TROMETHAMINE 30 MG: 30 INJECTION, SOLUTION INTRAMUSCULAR at 04:04

## 2020-04-15 RX ADMIN — DOCUSATE SODIUM 200 MG: 100 CAPSULE, LIQUID FILLED ORAL at 08:04

## 2020-04-15 RX ADMIN — ACETAMINOPHEN 650 MG: 325 TABLET ORAL at 11:04

## 2020-04-15 RX ADMIN — DOCUSATE SODIUM 200 MG: 100 CAPSULE, LIQUID FILLED ORAL at 09:04

## 2020-04-15 RX ADMIN — IBUPROFEN 800 MG: 400 TABLET, FILM COATED ORAL at 07:04

## 2020-04-15 RX ADMIN — IBUPROFEN 800 MG: 400 TABLET, FILM COATED ORAL at 11:04

## 2020-04-15 RX ADMIN — ACETAMINOPHEN 325 MG: 325 TABLET ORAL at 10:04

## 2020-04-15 RX ADMIN — Medication 1 TABLET: at 09:04

## 2020-04-15 NOTE — PLAN OF CARE
VSS. Fundus firm, midline with light to moderate bleeding. Incision site is C/D/I, no drainage noted. Dressing removed. Pain controlled with pain regimen. Mother is active in baby's care, bonding with infant. No concerns at this time. Pt safety maintained, will continue to monitor.

## 2020-04-15 NOTE — ANESTHESIA POSTPROCEDURE EVALUATION
Anesthesia Post Evaluation    Patient: Astrid Kaemmerling    Procedure(s) Performed: Procedure(s) (LRB):   SECTION (N/A)    Final Anesthesia Type: spinal    Patient location during evaluation: labor & delivery  Patient participation: Yes- Able to Participate  Level of consciousness: awake and alert and oriented  Post-procedure vital signs: reviewed and stable  Pain management: adequate  Airway patency: patent  CHAIM mitigation strategies: Multimodal analgesia and Use of major conduction anesthesia (spinal/epidural) or peripheral nerve block  PONV status at discharge: No PONV  Anesthetic complications: no      Cardiovascular status: blood pressure returned to baseline  Respiratory status: unassisted  Hydration status: euvolemic  Follow-up not needed.          Vitals Value Taken Time   BP 93/50 4/15/2020  8:03 AM   Temp 36.6 °C (97.9 °F) 4/15/2020  8:03 AM   Pulse 83 4/15/2020  8:03 AM   Resp 18 4/15/2020  8:03 AM   SpO2 97 % 4/15/2020  8:03 AM         Event Time     Out of Recovery 12:05:00          Pain/Aquiles Score: Pain Rating Prior to Med Admin: 4 (4/15/2020 11:02 AM)  Pain Rating Post Med Admin: 0 (4/15/2020  5:40 AM)

## 2020-04-15 NOTE — LACTATION NOTE
04/15/20 1315   Maternal Assessment   Breast Size Issue yes, bilateral  (minimal breast tissue/ small breasts)   Breast Shape Bilateral:;tubular;wide   Breast Density Bilateral:;soft   Areola Bilateral:;elastic   Nipples Bilateral:;everted;bulbous   Left Nipple Symptoms abraded;bleeding;painful   Right Nipple Symptoms abraded;painful   Maternal Infant Feeding   Maternal Emotional State assist needed;tense   Infant Positioning clutch/football   Pain with Feeding yes   Pain Location nipple, right   Pain Description sharp;soreness   Comfort Measures Before/During Feeding latch adjusted;suction broken using finger   Nipple Shape After Feeding, Left compressed   Latch Assistance yes   Equipment Type   Breast Pump Type double electric, hospital grade   Breast Pump Flange Type hard   Breast Pump Flange Size 27 mm   Breast Pumping   Breast Pumping Interventions frequent pumping encouraged   Breast Pumping double electric breast pump utilized   Patient requested assistance with nursing due to sore nipples. Baby is rooting and sucking on his hand. Assisted with positioning to L breast in football skin to skin. Baby latched with wide gape and sustained latch. Mother complained of pain that did not subside as baby nursed for several minutes. Baby unlatched at mother's request. Nipple was compressed on both sides and small amount of bleeding present. Mother states she does not want to breastfeed until her nipples are healed. Discussed alternate methods of feeding- spoon or cup. Parents choose to nipple/bottle feed baby formula or EBM. FOB paced bottle fed baby while mother initiated pumping with Symphony. Instructions for use discussed and demonstrated. Mother instructed to pump 8 or more in 24 hours on Initiation setting at highest suction level that is most comfortable.

## 2020-04-16 VITALS
OXYGEN SATURATION: 97 % | WEIGHT: 207.25 LBS | DIASTOLIC BLOOD PRESSURE: 63 MMHG | HEIGHT: 68 IN | RESPIRATION RATE: 18 BRPM | BODY MASS INDEX: 31.41 KG/M2 | TEMPERATURE: 98 F | HEART RATE: 85 BPM | SYSTOLIC BLOOD PRESSURE: 116 MMHG

## 2020-04-16 PROCEDURE — 99024 POSTOP FOLLOW-UP VISIT: CPT | Mod: ,,, | Performed by: STUDENT IN AN ORGANIZED HEALTH CARE EDUCATION/TRAINING PROGRAM

## 2020-04-16 PROCEDURE — 25000003 PHARM REV CODE 250: Performed by: OBSTETRICS & GYNECOLOGY

## 2020-04-16 PROCEDURE — 99024 PR POST-OP FOLLOW-UP VISIT: ICD-10-PCS | Mod: ,,, | Performed by: STUDENT IN AN ORGANIZED HEALTH CARE EDUCATION/TRAINING PROGRAM

## 2020-04-16 RX ADMIN — IBUPROFEN 800 MG: 400 TABLET, FILM COATED ORAL at 02:04

## 2020-04-16 RX ADMIN — ACETAMINOPHEN 650 MG: 325 TABLET ORAL at 05:04

## 2020-04-16 RX ADMIN — DOCUSATE SODIUM 200 MG: 100 CAPSULE, LIQUID FILLED ORAL at 08:04

## 2020-04-16 RX ADMIN — ACETAMINOPHEN 650 MG: 325 TABLET ORAL at 11:04

## 2020-04-16 RX ADMIN — IBUPROFEN 800 MG: 400 TABLET, FILM COATED ORAL at 11:04

## 2020-04-16 RX ADMIN — Medication 1 TABLET: at 08:04

## 2020-04-16 NOTE — LACTATION NOTE
"Patient states she pump "every few hours" last night and baby fed formula via paced bottle feeding. At the last pumping session a few drops from the L breast expressed. Declines assistance with latch/nursing. Mother states plan is to pump exclusively and bottle feed for now until she feels she has a milk supply established. Encouraged to speak with pediatrician about baby's latch/nursing issues and that it is painful and inquiring about possible SLP consult if parents desire. Reviewed pumping instructions for discharge and discussed pumping 8/24 hours, hands on pumping and hand expression. Instructed to take all pump kit pieces (including tubing) in case she has  To rent Symphony later. She has a Medela Pump In Style at home. LC number remains on board to call as needed.   "

## 2020-04-16 NOTE — DISCHARGE SUMMARY
Delivery Discharge Summary  Obstetrics      Primary OB Clinician: Akosua Gandhi MD      Admission date: 2020  Discharge date: 2020    Disposition: To home, self care    Discharge Diagnosis List:      Patient Active Problem List   Diagnosis    DUB (dysfunctional uterine bleeding)    Hirsutism    Status post 1LTCS (breech)       Procedure: , due to breech presentation    Hospital Course:  Astrid Kaemmerling is a 34 y.o. now , POD #2 who was admitted on 2020 at 39w0d for primary  2/2 breech presentation. Patient was subsequently admitted to labor and delivery unit with signed consents.     Delivery was uncomplicated and resulted in delivery without complications.     Please see delivery note for further details. Her postpartum course was uncomplicated. On discharge day, patient's pain is controlled with oral pain medications. Pt is tolerating ambulation without SOB or CP, and regular diet without N/V. Reports lochia is mild. Denies any HA, vision changes, F/C, LE swelling. Denies any breast pain/soreness.    Pt in stable condition and ready for discharge. She has been instructed to start and/or continue medications and follow up with her obstetrics provider as listed below.    Pertinent studies:  CBC  Recent Labs   Lab 20  0726   WBC 8.86   HGB 11.4*   HCT 33.7*   MCV 88             Immunization History   Administered Date(s) Administered    Influenza 2019    Tdap 2020        Delivery:    Episiotomy:     Lacerations:     Repair suture:     Repair # of packets:     Blood loss (ml): 0     Birth information:  YOB: 2022   Time of birth: 9:01 AM   Sex: male   Delivery type: , Low Transverse   Gestational Age: 39w0d    Delivery Clinician:      Other providers:       Additional  information:  Forceps:    Vacuum:    Breech:    Observed anomalies      Living?:           APGARS  One minute Five minutes Ten minutes   Skin color:          Heart rate:         Grimace:         Muscle tone:         Breathing:         Totals: 9  9        Placenta: Delivered:       appearance      Patient Instructions:   Current Discharge Medication List      START taking these medications    Details   ibuprofen (ADVIL,MOTRIN) 600 MG tablet Take 1 tablet (600 mg total) by mouth every 6 (six) hours as needed for Pain.  Qty: 30 tablet, Refills: 0      oxyCODONE-acetaminophen (PERCOCET) 5-325 mg per tablet Take 1 tablet by mouth every 6 (six) hours as needed for Pain.  Qty: 20 tablet, Refills: 0    Comments: Quantity prescribed more than 7 day supply? Yes, quantity medically necessary         CONTINUE these medications which have NOT CHANGED    Details   albuterol (PROVENTIL HFA) 90 mcg/actuation inhaler Inhale 2 puffs into the lungs every 6 (six) hours as needed for Wheezing. Rescue  Qty: 18 g, Refills: 3    Associated Diagnoses: Mild asthma, unspecified whether complicated, unspecified whether persistent      beclomethasone (QVAR) 80 mcg/actuation Aero Inhale 1 puff into the lungs 2 (two) times daily. Controller  Qty: 8.7 g, Refills: 3    Associated Diagnoses: Asthma affecting pregnancy in third trimester      calcium carbonate (OS-TIMOTHY) 600 mg calcium (1,500 mg) Tab Take 600 mg by mouth once.      docosahexanoic acid (DHA PRENATAL ORAL) Take by mouth.      ferrous sulfate 325 (65 FE) MG EC tablet Take 325 mg by mouth 3 (three) times daily with meals.      fexofenadine (ALLEGRA) 60 MG tablet Take 60 mg by mouth once daily.      folic acid (FOLVITE) 800 MCG Tab Take 800 mcg by mouth once daily.      magnesium 30 mg Tab Take by mouth once.      pyridoxine, vitamin B6, (VITAMIN B-6) 100 MG Tab Take 100 mg by mouth once daily.      vitamin D (VITAMIN D3) 1000 units Tab Take 1,000 Units by mouth once daily.             Discharge Procedure Orders   Diet Adult Regular     Other restrictions (specify):   Order Comments: No heavy lifting over 5 lbs  No baths until 6 week check  up. Showers only.   No driving until off narcotic pain medication/not feeling drowsy or dizzy when driving.     Notify your health care provider if you experience any of the following:  temperature >100.4     Notify your health care provider if you experience any of the following:  persistent nausea and vomiting or diarrhea     Notify your health care provider if you experience any of the following:  severe uncontrolled pain     Notify your health care provider if you experience any of the following:  redness, tenderness, or signs of infection (pain, swelling, redness, odor or green/yellow discharge around incision site)     Notify your health care provider if you experience any of the following:  difficulty breathing or increased cough     Notify your health care provider if you experience any of the following:  severe persistent headache     Notify your health care provider if you experience any of the following:  worsening rash     Notify your health care provider if you experience any of the following:  persistent dizziness, light-headedness, or visual disturbances     Notify your health care provider if you experience any of the following:  increased confusion or weakness     Notify your health care provider if you experience any of the following:   Order Comments: Call if you are saturating more than 1 pad an hour for 2 consecutive hours.       Follow-up Information     Akosua Gandhi MD. Schedule an appointment as soon as possible for a visit in 6 weeks.    Specialties:  Obstetrics, Obstetrics and Gynecology  Why:  Postoperative appointment  Contact information:  [address] [address] [phone]                    Oliva Blum M.D.  OBGYN PGY1

## 2020-04-16 NOTE — PROGRESS NOTES
POSTPARTUM PROGRESS NOTE     Astrid Kaemmerling is a 34 y.o. 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   Patient is doing well this morning. She denies nausea, vomiting, fever or chills.  Patient reports moderate abdominal pain that is well relieved by oral pain medications. Lochia is mild to moderate . Patient is voiding without difficulty and ambulating with no difficulty. She has passed flatus, and has not had BM.  Patient does plan to breast feed. Patient will defer to primary ob for contraception. She declines circumcision.     Objective:       Temp:  [97.9 °F (36.6 °C)-98.7 °F (37.1 °C)] 98.7 °F (37.1 °C)  Pulse:  [82-85] 85  Resp:  [18] 18  SpO2:  [95 %-97 %] 95 %  BP: ()/(50-55) 111/55    General:   alert, appears stated age and cooperative   Lungs:   Non-labored respirations   Heart:   RRR   Abdomen:  soft, non-tender   Uterus:  firm located at the umblicus.    Incision: Incision clean, dry and intact   Extremities: peripheral pulses normal, no pedal edema, no clubbing or cyanosis     Lab Review  No results found for this or any previous visit (from the past 4 hour(s)).    I/O  No intake or output data in the 24 hours ending 20 0705     Assessment:     Patient Active Problem List   Diagnosis    DUB (dysfunctional uterine bleeding)    Hirsutism    Status post 1LTCS (breech)        Plan:   1. Postpartum care:  - Patient doing well. Continue routine management and advances.  - Continue PO pain meds. Pain well controlled.  - Heme: H/h   - Encourage ambulation  - Circumcision -- patient declines   - Contraception per primary ob   - Lactation consultation PRN  - Rh status A pos     2. Asthma   - proventil rescue inhaler  - QVar daily   - continue inpatient     3. Genetic carrier   - +FMR1 and Fadi-Sachs   - fetal testing normal (amnio)    Dispo: As  patient meets milestones, will plan to discharge POD#2-4.    Oliva Blum M.D.  OBJOSEFINAN PGY1

## 2020-04-16 NOTE — PLAN OF CARE
Patient in no apparent distress. VSS. Ambulating without difficulty. No needs at this time. Discharge papers reviewed. Home medications reviewed. Mother Baby care guide reviewed prior to discharge. All questions answered.

## 2020-04-19 ENCOUNTER — PATIENT MESSAGE (OUTPATIENT)
Dept: OBSTETRICS AND GYNECOLOGY | Facility: CLINIC | Age: 35
End: 2020-04-19

## 2020-04-19 ENCOUNTER — TELEPHONE (OUTPATIENT)
Dept: LACTATION | Facility: CLINIC | Age: 35
End: 2020-04-19

## 2020-04-20 NOTE — TELEPHONE ENCOUNTER
Pt requesting Franky Bruno ointment.    Rx phoned in to [facility] pharmacy.

## 2020-04-21 ENCOUNTER — TELEPHONE (OUTPATIENT)
Dept: LACTATION | Facility: CLINIC | Age: 35
End: 2020-04-21

## 2020-04-21 NOTE — TELEPHONE ENCOUNTER
Lactation note: Follow up call on progress with breastfeeding. Second attempt.  Left message on voicemail.

## 2020-04-23 ENCOUNTER — PATIENT MESSAGE (OUTPATIENT)
Dept: OBSTETRICS AND GYNECOLOGY | Facility: CLINIC | Age: 35
End: 2020-04-23

## 2020-04-24 ENCOUNTER — PATIENT MESSAGE (OUTPATIENT)
Dept: OBSTETRICS AND GYNECOLOGY | Facility: CLINIC | Age: 35
End: 2020-04-24

## 2020-04-30 ENCOUNTER — OFFICE VISIT (OUTPATIENT)
Dept: OBSTETRICS AND GYNECOLOGY | Facility: CLINIC | Age: 35
End: 2020-04-30
Payer: COMMERCIAL

## 2020-04-30 DIAGNOSIS — Z09 POSTOP CHECK: Primary | ICD-10-CM

## 2020-04-30 PROCEDURE — 99024 PR POST-OP FOLLOW-UP VISIT: ICD-10-PCS | Mod: 95,,, | Performed by: OBSTETRICS & GYNECOLOGY

## 2020-04-30 PROCEDURE — 99024 POSTOP FOLLOW-UP VISIT: CPT | Mod: 95,,, | Performed by: OBSTETRICS & GYNECOLOGY

## 2020-04-30 NOTE — PROGRESS NOTES
The patient location is: home  The chief complaint leading to consultation is: postop  Visit type: audiovisual  Total time spent with patient: 12 min  Each patient to whom he or she provides medical services by telemedicine is:  (1) informed of the relationship between the physician and patient and the respective role of any other health care provider with respect to management of the patient; and (2) notified that he or she may decline to receive medical services by telemedicine and may withdraw from such care at any time.    Notes:     33 yo female presents for postoperative visit s/p  on 20. She denies complaints. Patient reports that her incision is healing well. The steri-strips fell off yesterday. She denies pain; didn't require narcotics. Patient has had multiple lactation consults because she hasn't been producing enough milk. Baby is latching at least 3 times a day; she is pumping 8 times a day; she is supplementing with formula. Baby is doing well. Patient reports that she initially was upset that she didn't produce enough but now feels good about the system they have going. She is enjoying daily walks that help with mood; denies depression/anxiety.    Gen: AAO x 3, NAD  There were no vitals filed for this visit.  Incision: deferred   Pelvic: deferred      A/P postoperative visit  - f/u for postpartum visit with Dr. Gandhi. She will call with any questions/issues.

## 2020-05-01 ENCOUNTER — TELEPHONE (OUTPATIENT)
Dept: OBSTETRICS AND GYNECOLOGY | Facility: CLINIC | Age: 35
End: 2020-05-01

## 2020-05-01 NOTE — TELEPHONE ENCOUNTER
----- Message from Annelise Sylvester MD sent at 4/30/2020 10:17 AM CDT -----  Please call patient to schedule 6 week postpartum visit with Dr. Gandhi.

## 2020-05-02 ENCOUNTER — PATIENT MESSAGE (OUTPATIENT)
Dept: OBSTETRICS AND GYNECOLOGY | Facility: CLINIC | Age: 35
End: 2020-05-02

## 2020-05-20 ENCOUNTER — TELEPHONE (OUTPATIENT)
Dept: OBSTETRICS AND GYNECOLOGY | Facility: CLINIC | Age: 35
End: 2020-05-20

## 2020-05-20 RX ORDER — FLUCONAZOLE 100 MG/1
100 TABLET ORAL DAILY
Qty: 10 TABLET | Refills: 0 | Status: SHIPPED | OUTPATIENT
Start: 2020-05-20 | End: 2020-05-31

## 2020-05-20 NOTE — TELEPHONE ENCOUNTER
"Pt c/o stinging bursts of pain in left breast when breasts are full. Also reports a headache and feeling sluggish. Latching is painful.  Afebrile.  States she used cooling pads for breasts last night "so they must have been warm"  Baby has Thrush and is being treated.       Dr. Franky Bruno's nipple ointment and Diflucan pended   "

## 2020-05-26 ENCOUNTER — POSTPARTUM VISIT (OUTPATIENT)
Dept: OBSTETRICS AND GYNECOLOGY | Facility: CLINIC | Age: 35
End: 2020-05-26
Payer: COMMERCIAL

## 2020-05-26 VITALS
DIASTOLIC BLOOD PRESSURE: 66 MMHG | SYSTOLIC BLOOD PRESSURE: 108 MMHG | BODY MASS INDEX: 27.68 KG/M2 | WEIGHT: 182.63 LBS | HEIGHT: 68 IN

## 2020-05-26 DIAGNOSIS — Z30.41 ENCOUNTER FOR SURVEILLANCE OF CONTRACEPTIVE PILLS: Primary | ICD-10-CM

## 2020-05-26 PROCEDURE — 99999 PR PBB SHADOW E&M-EST. PATIENT-LVL III: CPT | Mod: PBBFAC,,, | Performed by: OBSTETRICS & GYNECOLOGY

## 2020-05-26 PROCEDURE — 0503F PR POSTPARTUM CARE VISIT: ICD-10-PCS | Mod: S$GLB,,, | Performed by: OBSTETRICS & GYNECOLOGY

## 2020-05-26 PROCEDURE — 99999 PR PBB SHADOW E&M-EST. PATIENT-LVL III: ICD-10-PCS | Mod: PBBFAC,,, | Performed by: OBSTETRICS & GYNECOLOGY

## 2020-05-26 PROCEDURE — 0503F POSTPARTUM CARE VISIT: CPT | Mod: S$GLB,,, | Performed by: OBSTETRICS & GYNECOLOGY

## 2020-05-26 RX ORDER — ACETAMINOPHEN AND CODEINE PHOSPHATE 120; 12 MG/5ML; MG/5ML
1 SOLUTION ORAL DAILY
Qty: 30 TABLET | Refills: 11 | Status: SHIPPED | OUTPATIENT
Start: 2020-05-26 | End: 2020-09-01

## 2020-06-09 ENCOUNTER — TELEPHONE (OUTPATIENT)
Dept: OBSTETRICS AND GYNECOLOGY | Facility: CLINIC | Age: 35
End: 2020-06-09

## 2020-06-09 NOTE — TELEPHONE ENCOUNTER
Pt calling back, work has come up and she can no longer make the appt.  Advised it sounds muscular since it hurts worse with movement.  Recommended she rest, use heat and Motrin.  Advised if it becomes severe she should go to the ED.

## 2020-06-09 NOTE — TELEPHONE ENCOUNTER
8 week PP Pt developed a sharp pain in RLQ when she was trying to get up from the floor yesterday.  Its no longer sharp but its concerning her that she is still there.  Worse with movement but nothing really makes it better besides sitting still.  Advised it sounds like she may have pulled something.  Requesting an appt.  Scheduled today with Dr. Sylvester.

## 2020-07-15 NOTE — PROGRESS NOTES
Astrid Kaemmerling is a 35 y.o. female  presents for a postpartum visit.  She is status post  delivery 6 weeks ago for Breech presentation.  Her hospitalization was not complicated.  She is bottle and breast feeding because of poor milk production.  She desires Micronor contraception.  She denies postpartum depression.  She noticed her moods were better after Mirena was taken out.    Last annual exam:  19    Current Outpatient Medications:     albuterol (PROVENTIL HFA) 90 mcg/actuation inhaler, Inhale 2 puffs into the lungs every 6 (six) hours as needed for Wheezing. Rescue, Disp: 18 g, Rfl: 3    beclomethasone (QVAR) 80 mcg/actuation Aero, Inhale 1 puff into the lungs 2 (two) times daily. Controller, Disp: 8.7 g, Rfl: 3    calcium carbonate (OS-TIMOTHY) 600 mg calcium (1,500 mg) Tab, Take 600 mg by mouth once., Disp: , Rfl:     docosahexanoic acid (DHA PRENATAL ORAL), Take by mouth., Disp: , Rfl:     ferrous sulfate 325 (65 FE) MG EC tablet, Take 325 mg by mouth 3 (three) times daily with meals., Disp: , Rfl:     fluconazole (DIFLUCAN) 100 MG tablet, Take 1 tablet (100 mg total) by mouth once daily. for 10 days, Disp: 10 tablet, Rfl: 0    folic acid (FOLVITE) 800 MCG Tab, Take 800 mcg by mouth once daily., Disp: , Rfl:     miguel ortega ointment, Apply topically 3 (three) times daily. Apply after feeding. Do not wash off. This compounded medication expires in 30 days., Disp: 30 g, Rfl: 1    magnesium 30 mg Tab, Take by mouth once., Disp: , Rfl:     pyridoxine, vitamin B6, (VITAMIN B-6) 100 MG Tab, Take 100 mg by mouth once daily., Disp: , Rfl:     vitamin D (VITAMIN D3) 1000 units Tab, Take 1,000 Units by mouth once daily., Disp: , Rfl:     norethindrone (ORTHO MICRONOR) 0.35 mg tablet, Take 1 tablet (0.35 mg total) by mouth once daily., Disp: 30 tablet, Rfl: 11  No current facility-administered medications for this visit.     Facility-Administered Medications Ordered in Other Visits:      Patient would like to proceed with hysterectomy for problem instead of doing the hysteroscopy.  Prefers to just be done with this issue.     I re-explained the purpose of hysteroscopy and D&C is to further workup her imaging findings and postmenopausal bleeding. If I would find a cancer then a simple hysterectomy is not the right treatment for her. If cancer is found I would refer her to GYN/ONCOLOGIST and the procedure changes.  I want to make sure we are properly working up her problem so the correct treatment/ surgery is done for her.     After discussing with the patient she understands and although she prefers just to have a hysterectomy to \"get it done\" she is going to proceed with my outlined surgery as planned-  Hysteroscopy, possible polypectomy and D&C        acetaminophen tablet 650 mg, 650 mg, Oral, Q6H, Akosua Gandhi MD, 650 mg at 04/16/20 1103    carboprost injection 250 mcg, 250 mcg, Intramuscular, Q15 Min PRN, Akosua Gandhi MD    cefazolin (ANCEF) 2 gram in dextrose 5% 50 mL IVPB (premix), 2 g, Intravenous, On Call Procedure, Akosua Gandhi MD    diphenhydrAMINE capsule 25 mg, 25 mg, Oral, Q6H PRN, Akosua Gandhi MD    diphenhydrAMINE injection 12.5 mg, 12.5 mg, Intravenous, Q20 Min PRN, Aksoua Gandhi MD    docusate sodium capsule 200 mg, 200 mg, Oral, BID, Akosua Gandhi MD, 200 mg at 04/16/20 0824    hydrocortisone 2.5 % rectal cream, , Rectal, TID PRN, Akosua Gandhi MD    [COMPLETED] ketorolac injection 30 mg, 30 mg, Intravenous, Q6H, 30 mg at 04/15/20 0457 **FOLLOWED BY** ibuprofen tablet 800 mg, 800 mg, Oral, Q8H, Akosua Gandhi MD, 800 mg at 04/16/20 1104    lactated ringers infusion, , Intravenous, Continuous, Akosua Gandhi MD, Last Rate: 125 mL/hr at 04/14/20 0722    lactated ringers infusion, , Intravenous, Continuous, Akosua Gandhi MD    lanolin cream, , Topical (Top), PRN, Akosua Gandhi MD    measles, mumps and rubella vaccine 1,000-12,500 TCID50/0.5 mL injection 0.5 mL, 0.5 mL, Subcutaneous, vaccine x 1 dose, Akosua Gandhi MD    methylergonovine injection 200 mcg, 200 mcg, Intramuscular, Once PRN, Akosua Gandhi MD    miSOPROStoL tablet 800 mcg, 800 mcg, Oral, Once PRN, Akosua Gandhi MD    miSOPROStoL tablet 800 mcg, 800 mcg, Rectal, Once PRN, Akosua Gandhi MD    nalbuphine injection 5 mg, 5 mg, Intravenous, Once PRN, Akosua Gandhi MD    ondansetron disintegrating tablet 8 mg, 8 mg, Oral, Q8H PRN, Akosua Gandhi MD    ondansetron injection 4 mg, 4 mg, Intravenous, Q6H PRN, Akosua Gandhi MD    oxyCODONE immediate release tablet 10 mg, 10 mg, Oral, Q4H PRN, Akosua Gandhi MD    oxyCODONE immediate release tablet 5 mg, 5 mg, Oral, Q4H PRN, Akosua YOUNG  "MD Hiram    prenatal vitamin oral tablet, 1 tablet, Oral, Daily, Akosua Gandhi MD, 1 tablet at 20 0824    promethazine (PHENERGAN) 6.25 mg in dextrose 5 % 50 mL IVPB, 6.25 mg, Intravenous, Q6H PRN, Akosua Gandhi MD    senna-docusate 8.6-50 mg per tablet 1 tablet, 1 tablet, Oral, Nightly PRN, Akosua Gandhi MD    simethicone chewable tablet 80 mg, 1 tablet, Oral, Q6H PRN, Akosua Gandhi MD    Tdap vaccine injection 0.5 mL, 0.5 mL, Intramuscular, vaccine x 1 dose, Akousa Gandhi MD    Vitals:    20 1025   BP: 108/66   Weight: 82.9 kg (182 lb 10.4 oz)   Height: 5' 8" (1.727 m)   PainSc: 0-No pain     Body mass index is 27.77 kg/m².    Physical Exam:  General: healthy, alert, no distress  Abdomen: no masses, hepatosplenomegaly, no hernias  Incision:  Clean, dry, and intact  External genitalia: normal, well-healed, without lesions or masses  Vagina: normal, well-healed, physiologic discharge, without lesions  Cervix: normal, well-healed, without lesions  Uterus: normal size, well involuted, firm, non-tender  Adnexa: no masses palpable and nontender    Assessment:  Normal postpartum exam    Plan:    Encounter for surveillance of contraceptive pills  -     norethindrone (ORTHO MICRONOR) 0.35 mg tablet; Take 1 tablet (0.35 mg total) by mouth once daily.  Dispense: 30 tablet; Refill: 11     status post  delivery.    We discussed she will need some estradiol when done breastfeeding.  She is considering Mirena IUD + oral estradiol or Copper T + oral estradiol and oral progesterone due to POI  Regular activity.  Follow up in 2 months for an annual exam.      "

## 2020-09-01 ENCOUNTER — OFFICE VISIT (OUTPATIENT)
Dept: OBSTETRICS AND GYNECOLOGY | Facility: CLINIC | Age: 35
End: 2020-09-01
Attending: OBSTETRICS & GYNECOLOGY
Payer: COMMERCIAL

## 2020-09-01 VITALS
DIASTOLIC BLOOD PRESSURE: 60 MMHG | SYSTOLIC BLOOD PRESSURE: 90 MMHG | HEIGHT: 68 IN | WEIGHT: 176.38 LBS | BODY MASS INDEX: 26.73 KG/M2

## 2020-09-01 DIAGNOSIS — N94.10 DYSPAREUNIA, FEMALE: ICD-10-CM

## 2020-09-01 DIAGNOSIS — Z23 NEED FOR HPV VACCINATION: Primary | ICD-10-CM

## 2020-09-01 DIAGNOSIS — Z30.41 ENCOUNTER FOR SURVEILLANCE OF CONTRACEPTIVE PILLS: ICD-10-CM

## 2020-09-01 DIAGNOSIS — E28.39 PREMATURE OVARIAN INSUFFICIENCY: ICD-10-CM

## 2020-09-01 DIAGNOSIS — Z01.419 ENCOUNTER FOR GYNECOLOGICAL EXAMINATION: ICD-10-CM

## 2020-09-01 PROBLEM — N93.8 DUB (DYSFUNCTIONAL UTERINE BLEEDING): Status: RESOLVED | Noted: 2019-05-14 | Resolved: 2020-09-01

## 2020-09-01 PROBLEM — L68.0 HIRSUTISM: Status: RESOLVED | Noted: 2019-05-14 | Resolved: 2020-09-01

## 2020-09-01 PROCEDURE — 99395 PREV VISIT EST AGE 18-39: CPT | Mod: S$GLB,,, | Performed by: OBSTETRICS & GYNECOLOGY

## 2020-09-01 PROCEDURE — 99395 PR PREVENTIVE VISIT,EST,18-39: ICD-10-PCS | Mod: S$GLB,,, | Performed by: OBSTETRICS & GYNECOLOGY

## 2020-09-01 PROCEDURE — 99999 PR PBB SHADOW E&M-EST. PATIENT-LVL III: ICD-10-PCS | Mod: PBBFAC,,, | Performed by: OBSTETRICS & GYNECOLOGY

## 2020-09-01 PROCEDURE — 99999 PR PBB SHADOW E&M-EST. PATIENT-LVL III: CPT | Mod: PBBFAC,,, | Performed by: OBSTETRICS & GYNECOLOGY

## 2020-09-01 RX ORDER — PRASTERONE 6.5 MG/1
6.5 INSERT VAGINAL NIGHTLY
Qty: 30 EACH | Refills: 11 | Status: SHIPPED | OUTPATIENT
Start: 2020-09-01 | End: 2021-10-11

## 2020-09-01 RX ORDER — NORGESTIMATE AND ETHINYL ESTRADIOL 0.25-0.035
1 KIT ORAL DAILY
Qty: 30 TABLET | Refills: 11 | Status: SHIPPED | OUTPATIENT
Start: 2020-09-01 | End: 2020-10-09 | Stop reason: SDUPTHER

## 2020-09-01 NOTE — PROGRESS NOTES
Subjective:       Patient ID: Astrid Kaemmerling is a 35 y.o. female.    Chief Complaint:  Well Woman (Annual  --  last pap/hpv 19, Neg  )      History of Present Illness.  Astrid Kaemmerling is a 35 y.o. female.  She has no breast or urinary symptoms.  She has had no period yet.  She stopped breastfeeding 2 weeks ago.  She has no postcoital bleeding, pelvic pain, vaginal dryness, vaginal discharge, or sexual complaints.  Sex is painful.  The opening feels tight.    GYN & OB History  Patient's last menstrual period was 2019 (exact date).   Last Pap: 2019 Normal HPV negative  Gardasil:  No    OB History    Para Term  AB Living   1 1 1 0 0 1   SAB TAB Ectopic Multiple Live Births   0 0 0 0 1      # Outcome Date GA Lbr Urbano/2nd Weight Sex Delivery Anes PTL Lv   1 Term 20 39w0d  3.97 kg (8 lb 12 oz) M CS-LTranv Spinal  MASTER      Obstetric Comments   Menarche at 12       Past Medical History:   Diagnosis Date    Asthma     Counseling for HPV (human papillomavirus) vaccination 2019    Not received (By the time [facility] received the Hpv Vaccines, she was too old)      Past Surgical History:   Procedure Laterality Date     SECTION N/A 2020    Procedure:  SECTION;  Surgeon: Akosua Gandhi MD;  Location: [facility], [place], operated by [facility] L&D;  Service: OB/GYN;  Laterality: N/A;     SECTION  2020    Boy     FOOT SURGERY Right      Family History   Problem Relation Age of Onset    Colon cancer Paternal Grandfather     Prostate cancer Paternal Grandfather         30's    Breast cancer Maternal Grandmother         70's    Stroke Maternal Grandmother     Heart attack Maternal Grandfather     Parkinsonism Paternal Grandmother     No Known Problems Father     No Known Problems Mother     No Known Problems Brother     No Known Problems Brother     Ovarian cancer Neg Hx      Social History     Tobacco Use    Smoking status: Never Smoker    Smokeless tobacco: Never Used    Substance Use Topics    Alcohol use: Not Currently     Comment: once a week, not with pregnancy    Drug use: Never       Current Outpatient Medications:     albuterol (PROVENTIL HFA) 90 mcg/actuation inhaler, Inhale 2 puffs into the lungs every 6 (six) hours as needed for Wheezing. Rescue, Disp: 18 g, Rfl: 3    beclomethasone (QVAR) 80 mcg/actuation Aero, Inhale 1 puff into the lungs 2 (two) times daily. Controller, Disp: 8.7 g, Rfl: 3    UNABLE TO FIND, POSTNAL VITAMIN, Disp: , Rfl:     vitamin D (VITAMIN D3) 1000 units Tab, Take 1,000 Units by mouth once daily., Disp: , Rfl:     norgestimate-ethinyl estradioL (ORTHO-CYCLEN, 28,) 0.25-35 mg-mcg per tablet, Take 1 tablet by mouth once daily., Disp: 30 tablet, Rfl: 11    prasterone, dhea, (INTRAROSA) 6.5 mg Inst, Place 6.5 mg vaginally every evening., Disp: 30 each, Rfl: 11  No current facility-administered medications for this visit.     Facility-Administered Medications Ordered in Other Visits:     acetaminophen tablet 650 mg, 650 mg, Oral, Q6H, Akosua Gandhi MD, 650 mg at 04/16/20 1103    carboprost injection 250 mcg, 250 mcg, Intramuscular, Q15 Min PRN, Akosua Gandhi MD    cefazolin (ANCEF) 2 gram in dextrose 5% 50 mL IVPB (premix), 2 g, Intravenous, On Call Procedure, Akosua Gandhi MD    diphenhydrAMINE capsule 25 mg, 25 mg, Oral, Q6H PRN, Akosua Gandhi MD    diphenhydrAMINE injection 12.5 mg, 12.5 mg, Intravenous, Q20 Min PRN, Akosua Gandhi MD    docusate sodium capsule 200 mg, 200 mg, Oral, BID, Akosua Gandhi MD, 200 mg at 04/16/20 0824    hydrocortisone 2.5 % rectal cream, , Rectal, TID PRN, Akosua Gandhi MD    [COMPLETED] ketorolac injection 30 mg, 30 mg, Intravenous, Q6H, 30 mg at 04/15/20 0457 **FOLLOWED BY** ibuprofen tablet 800 mg, 800 mg, Oral, Q8H, Akosua Gandhi MD, 800 mg at 04/16/20 1104    lactated ringers infusion, , Intravenous, Continuous, Akosua Gandhi MD, Last Rate: 125 mL/hr at  04/14/20 0722    lactated ringers infusion, , Intravenous, Continuous, Akosua Gandhi MD    lanolin cream, , Topical (Top), PRN, Akosua Gandhi MD    measles, mumps and rubella vaccine 1,000-12,500 TCID50/0.5 mL injection 0.5 mL, 0.5 mL, Subcutaneous, vaccine x 1 dose, Akosua Gandhi MD    methylergonovine injection 200 mcg, 200 mcg, Intramuscular, Once PRN, Akosua Gandhi MD    miSOPROStoL tablet 800 mcg, 800 mcg, Oral, Once PRN, Akosua Gandhi MD    miSOPROStoL tablet 800 mcg, 800 mcg, Rectal, Once PRN, Akosua Gandhi MD    nalbuphine injection 5 mg, 5 mg, Intravenous, Once PRN, Akosua Gandhi MD    ondansetron disintegrating tablet 8 mg, 8 mg, Oral, Q8H PRN, Akosua Gandhi MD    ondansetron injection 4 mg, 4 mg, Intravenous, Q6H PRN, Akosua Gandhi MD    oxyCODONE immediate release tablet 10 mg, 10 mg, Oral, Q4H PRN, Akosua Gandhi MD    oxyCODONE immediate release tablet 5 mg, 5 mg, Oral, Q4H PRN, Akosua Gandhi MD    prenatal vitamin oral tablet, 1 tablet, Oral, Daily, Akosua Gandhi MD, 1 tablet at 04/16/20 0824    promethazine (PHENERGAN) 6.25 mg in dextrose 5 % 50 mL IVPB, 6.25 mg, Intravenous, Q6H PRN, Akosua Gandhi MD    senna-docusate 8.6-50 mg per tablet 1 tablet, 1 tablet, Oral, Nightly PRN, Akosua Gandhi MD    simethicone chewable tablet 80 mg, 1 tablet, Oral, Q6H PRN, Akosua Gandhi MD    Tdap vaccine injection 0.5 mL, 0.5 mL, Intramuscular, vaccine x 1 dose, Akosua Gandhi MD    Review of patient's allergies indicates:   Allergen Reactions    Apple Other (See Comments)     Asthma symptoms     Banana     Nuts [tree nut]        Review of Systems  Review of Systems   Constitutional: Negative for fatigue.   HENT: Negative for trouble swallowing.    Eyes: Negative for visual disturbance.   Respiratory: Negative for cough and shortness of breath.    Cardiovascular: Negative for chest pain.   Gastrointestinal: Negative for  "abdominal distention, abdominal pain, blood in stool, nausea and vomiting.   Genitourinary: Negative for difficulty urinating, dyspareunia, dysuria, flank pain, frequency, hematuria, pelvic pain, urgency, vaginal bleeding, vaginal discharge and vaginal pain.   Musculoskeletal: Negative for arthralgias.   Skin: Negative for rash.   Neurological: Negative for dizziness and headaches.   Psychiatric/Behavioral: Negative for sleep disturbance. The patient is not nervous/anxious.         Objective:     Vitals:    09/01/20 0833   BP: 90/60   Weight: 80 kg (176 lb 5.9 oz)   Height: 5' 8" (1.727 m)   PainSc: 0-No pain     Body mass index is 26.82 kg/m².      Physical Exam:   Constitutional: She is oriented to person, place, and time. She appears well-developed and well-nourished.    HENT:   Head: Normocephalic.     Neck: Normal range of motion. No thyromegaly present.     Pulmonary/Chest: Right breast exhibits no mass, no nipple discharge, no skin change, no tenderness and no swelling. Left breast exhibits no mass, no nipple discharge, no skin change, no tenderness and no swelling. Breasts are symmetrical.        Abdominal: Soft. Normal appearance and bowel sounds are normal. She exhibits no distension. There is no abdominal tenderness.     Genitourinary:    Vagina and uterus normal.      Pelvic exam was performed with patient supine.   There is no rash, tenderness, lesion or injury on the right labia. There is no rash, tenderness, lesion or injury on the left labia. Cervix is normal. Right adnexum displays no mass, no tenderness and no fullness. Left adnexum displays no mass, no tenderness and no fullness. No erythema in the vagina. Cervix exhibits no motion tenderness and no discharge. negative for vaginal discharge          Musculoskeletal: Normal range of motion.      Lymphadenopathy:        Right: No supraclavicular adenopathy present.        Left: No supraclavicular adenopathy present.    Neurological: She is alert and " oriented to person, place, and time.    Skin: Skin is warm and dry.    Psychiatric: She has a normal mood and affect.        Assessment/ Plan:     Need for HPV vaccination  -     HPV Vaccine (9-Valent) (3 Dose) (IM); Future; Expected date: 03/01/2021    Encounter for surveillance of contraceptive pills  -     norgestimate-ethinyl estradioL (ORTHO-CYCLEN, 28,) 0.25-35 mg-mcg per tablet; Take 1 tablet by mouth once daily.  Dispense: 30 tablet; Refill: 11    Encounter for gynecological examination    Premature ovarian insufficiency    Dyspareunia, female  -     prasterone, dhea, (INTRAROSA) 6.5 mg Inst; Place 6.5 mg vaginally every evening.  Dispense: 30 each; Refill: 11        Routine pap smears.  Self breast exam  Diet and exercise discussed.  Contraception reviewed/discussed.  Follow-up with me in 3 months.

## 2020-11-14 ENCOUNTER — IMMUNIZATION (OUTPATIENT)
Dept: PRIMARY CARE CLINIC | Facility: CLINIC | Age: 35
End: 2020-11-14
Payer: COMMERCIAL

## 2020-11-14 DIAGNOSIS — Z23 NEED FOR VACCINATION: Primary | ICD-10-CM

## 2020-11-14 PROCEDURE — 90686 IIV4 VACC NO PRSV 0.5 ML IM: CPT | Mod: S$GLB,,, | Performed by: FAMILY MEDICINE

## 2020-11-14 PROCEDURE — 90686 FLU VACCINE (QUAD) GREATER THAN OR EQUAL TO 3YO PRESERVATIVE FREE IM: ICD-10-PCS | Mod: S$GLB,,, | Performed by: FAMILY MEDICINE

## 2020-11-14 PROCEDURE — 90471 IMMUNIZATION ADMIN: CPT | Mod: S$GLB,,, | Performed by: FAMILY MEDICINE

## 2020-11-14 PROCEDURE — 90471 FLU VACCINE (QUAD) GREATER THAN OR EQUAL TO 3YO PRESERVATIVE FREE IM: ICD-10-PCS | Mod: S$GLB,,, | Performed by: FAMILY MEDICINE

## 2021-02-17 ENCOUNTER — PATIENT MESSAGE (OUTPATIENT)
Dept: OBSTETRICS AND GYNECOLOGY | Facility: CLINIC | Age: 36
End: 2021-02-17

## 2021-04-28 ENCOUNTER — PATIENT MESSAGE (OUTPATIENT)
Dept: RESEARCH | Facility: HOSPITAL | Age: 36
End: 2021-04-28

## 2021-06-02 NOTE — TELEPHONE ENCOUNTER
cont ASA, Crestor, Maxzide, Klorcon, Metoprolol  Monitor fluid status  Unchanged  Follow up with cardiology  Follow up with pcp  cardiac diet   zofran pended

## 2021-06-30 DIAGNOSIS — Z97.5 CONTRACEPTION, DEVICE INTRAUTERINE: Primary | ICD-10-CM

## 2021-07-13 ENCOUNTER — TELEPHONE (OUTPATIENT)
Dept: OBSTETRICS AND GYNECOLOGY | Facility: CLINIC | Age: 36
End: 2021-07-13

## 2021-08-17 ENCOUNTER — PROCEDURE VISIT (OUTPATIENT)
Dept: OBSTETRICS AND GYNECOLOGY | Facility: CLINIC | Age: 36
End: 2021-08-17
Attending: OBSTETRICS & GYNECOLOGY
Payer: COMMERCIAL

## 2021-08-17 VITALS
HEIGHT: 68 IN | BODY MASS INDEX: 24.72 KG/M2 | WEIGHT: 163.13 LBS | SYSTOLIC BLOOD PRESSURE: 118 MMHG | DIASTOLIC BLOOD PRESSURE: 70 MMHG

## 2021-08-17 DIAGNOSIS — Z01.812 PRE-PROCEDURE LAB EXAM: Primary | ICD-10-CM

## 2021-08-17 DIAGNOSIS — Z30.430 ENCOUNTER FOR IUD INSERTION: ICD-10-CM

## 2021-08-17 LAB
B-HCG UR QL: NEGATIVE
CTP QC/QA: YES

## 2021-08-17 PROCEDURE — 58300 INSERT INTRAUTERINE DEVICE: CPT | Mod: S$GLB,,, | Performed by: OBSTETRICS & GYNECOLOGY

## 2021-08-17 PROCEDURE — 81025 POCT URINE PREGNANCY: ICD-10-PCS | Mod: S$GLB,,, | Performed by: OBSTETRICS & GYNECOLOGY

## 2021-08-17 PROCEDURE — 81025 URINE PREGNANCY TEST: CPT | Mod: S$GLB,,, | Performed by: OBSTETRICS & GYNECOLOGY

## 2021-08-17 PROCEDURE — 58300 INSERTION OF IUD: ICD-10-PCS | Mod: S$GLB,,, | Performed by: OBSTETRICS & GYNECOLOGY

## 2021-08-17 RX ORDER — DEXAMETHASONE 4 MG/1
1 TABLET ORAL 2 TIMES DAILY
COMMUNITY
Start: 2021-07-27 | End: 2021-10-11 | Stop reason: SDUPTHER

## 2021-10-11 ENCOUNTER — OFFICE VISIT (OUTPATIENT)
Dept: PRIMARY CARE CLINIC | Facility: CLINIC | Age: 36
End: 2021-10-11
Payer: COMMERCIAL

## 2021-10-11 DIAGNOSIS — J45.20 MILD INTERMITTENT ASTHMA WITHOUT COMPLICATION: Primary | ICD-10-CM

## 2021-10-11 DIAGNOSIS — E28.39 PREMATURE OVARIAN INSUFFICIENCY: ICD-10-CM

## 2021-10-11 PROCEDURE — 1159F MED LIST DOCD IN RCRD: CPT | Mod: CPTII,95,, | Performed by: FAMILY MEDICINE

## 2021-10-11 PROCEDURE — 1159F PR MEDICATION LIST DOCUMENTED IN MEDICAL RECORD: ICD-10-PCS | Mod: CPTII,95,, | Performed by: FAMILY MEDICINE

## 2021-10-11 PROCEDURE — 99203 OFFICE O/P NEW LOW 30 MIN: CPT | Mod: 95,,, | Performed by: FAMILY MEDICINE

## 2021-10-11 PROCEDURE — 99203 PR OFFICE/OUTPT VISIT, NEW, LEVL III, 30-44 MIN: ICD-10-PCS | Mod: 95,,, | Performed by: FAMILY MEDICINE

## 2021-10-11 PROCEDURE — 1160F RVW MEDS BY RX/DR IN RCRD: CPT | Mod: CPTII,95,, | Performed by: FAMILY MEDICINE

## 2021-10-11 PROCEDURE — 1160F PR REVIEW ALL MEDS BY PRESCRIBER/CLIN PHARMACIST DOCUMENTED: ICD-10-PCS | Mod: CPTII,95,, | Performed by: FAMILY MEDICINE

## 2021-10-11 RX ORDER — DEXAMETHASONE 4 MG/1
1 TABLET ORAL 2 TIMES DAILY
Qty: 36 G | Refills: 3 | Status: SHIPPED | OUTPATIENT
Start: 2021-10-11 | End: 2022-12-07 | Stop reason: SDUPTHER

## 2021-11-12 ENCOUNTER — LAB VISIT (OUTPATIENT)
Dept: PRIMARY CARE CLINIC | Facility: CLINIC | Age: 36
End: 2021-11-12
Payer: COMMERCIAL

## 2021-11-12 ENCOUNTER — OFFICE VISIT (OUTPATIENT)
Dept: PRIMARY CARE CLINIC | Facility: CLINIC | Age: 36
End: 2021-11-12
Payer: COMMERCIAL

## 2021-11-12 VITALS
HEART RATE: 81 BPM | DIASTOLIC BLOOD PRESSURE: 82 MMHG | HEIGHT: 68 IN | WEIGHT: 165.25 LBS | SYSTOLIC BLOOD PRESSURE: 122 MMHG | OXYGEN SATURATION: 97 % | BODY MASS INDEX: 25.05 KG/M2

## 2021-11-12 DIAGNOSIS — Z00.00 ANNUAL PHYSICAL EXAM: ICD-10-CM

## 2021-11-12 DIAGNOSIS — Z00.00 ANNUAL PHYSICAL EXAM: Primary | ICD-10-CM

## 2021-11-12 LAB
CHOLEST SERPL-MCNC: 174 MG/DL (ref 120–199)
CHOLEST/HDLC SERPL: 2.6 {RATIO} (ref 2–5)
HDLC SERPL-MCNC: 68 MG/DL (ref 40–75)
HDLC SERPL: 39.1 % (ref 20–50)
LDLC SERPL CALC-MCNC: 95.6 MG/DL (ref 63–159)
NONHDLC SERPL-MCNC: 106 MG/DL
TRIGL SERPL-MCNC: 52 MG/DL (ref 30–150)

## 2021-11-12 PROCEDURE — 99395 PR PREVENTIVE VISIT,EST,18-39: ICD-10-PCS | Mod: S$GLB,,, | Performed by: FAMILY MEDICINE

## 2021-11-12 PROCEDURE — 3008F PR BODY MASS INDEX (BMI) DOCUMENTED: ICD-10-PCS | Mod: CPTII,S$GLB,, | Performed by: FAMILY MEDICINE

## 2021-11-12 PROCEDURE — 1159F MED LIST DOCD IN RCRD: CPT | Mod: CPTII,S$GLB,, | Performed by: FAMILY MEDICINE

## 2021-11-12 PROCEDURE — 3074F SYST BP LT 130 MM HG: CPT | Mod: CPTII,S$GLB,, | Performed by: FAMILY MEDICINE

## 2021-11-12 PROCEDURE — 3008F BODY MASS INDEX DOCD: CPT | Mod: CPTII,S$GLB,, | Performed by: FAMILY MEDICINE

## 2021-11-12 PROCEDURE — 3079F PR MOST RECENT DIASTOLIC BLOOD PRESSURE 80-89 MM HG: ICD-10-PCS | Mod: CPTII,S$GLB,, | Performed by: FAMILY MEDICINE

## 2021-11-12 PROCEDURE — 80061 LIPID PANEL: CPT | Performed by: FAMILY MEDICINE

## 2021-11-12 PROCEDURE — 36415 PR COLLECTION VENOUS BLOOD,VENIPUNCTURE: ICD-10-PCS | Mod: S$GLB,,, | Performed by: FAMILY MEDICINE

## 2021-11-12 PROCEDURE — 1160F PR REVIEW ALL MEDS BY PRESCRIBER/CLIN PHARMACIST DOCUMENTED: ICD-10-PCS | Mod: CPTII,S$GLB,, | Performed by: FAMILY MEDICINE

## 2021-11-12 PROCEDURE — 99999 PR PBB SHADOW E&M-EST. PATIENT-LVL IV: ICD-10-PCS | Mod: PBBFAC,,, | Performed by: FAMILY MEDICINE

## 2021-11-12 PROCEDURE — 99999 PR PBB SHADOW E&M-EST. PATIENT-LVL IV: CPT | Mod: PBBFAC,,, | Performed by: FAMILY MEDICINE

## 2021-11-12 PROCEDURE — 99395 PREV VISIT EST AGE 18-39: CPT | Mod: S$GLB,,, | Performed by: FAMILY MEDICINE

## 2021-11-12 PROCEDURE — 3074F PR MOST RECENT SYSTOLIC BLOOD PRESSURE < 130 MM HG: ICD-10-PCS | Mod: CPTII,S$GLB,, | Performed by: FAMILY MEDICINE

## 2021-11-12 PROCEDURE — 1159F PR MEDICATION LIST DOCUMENTED IN MEDICAL RECORD: ICD-10-PCS | Mod: CPTII,S$GLB,, | Performed by: FAMILY MEDICINE

## 2021-11-12 PROCEDURE — 3079F DIAST BP 80-89 MM HG: CPT | Mod: CPTII,S$GLB,, | Performed by: FAMILY MEDICINE

## 2021-11-12 PROCEDURE — 36415 COLL VENOUS BLD VENIPUNCTURE: CPT | Mod: S$GLB,,, | Performed by: FAMILY MEDICINE

## 2021-11-12 PROCEDURE — 1160F RVW MEDS BY RX/DR IN RCRD: CPT | Mod: CPTII,S$GLB,, | Performed by: FAMILY MEDICINE

## 2021-11-12 RX ORDER — AMOXICILLIN 500 MG/1
500 CAPSULE ORAL 3 TIMES DAILY
COMMUNITY
Start: 2021-11-11 | End: 2022-06-27

## 2021-12-01 DIAGNOSIS — Z11.59 NEED FOR HEPATITIS C SCREENING TEST: ICD-10-CM

## 2022-02-15 ENCOUNTER — TELEPHONE (OUTPATIENT)
Dept: OBSTETRICS AND GYNECOLOGY | Facility: CLINIC | Age: 37
End: 2022-02-15
Payer: COMMERCIAL

## 2022-06-27 ENCOUNTER — OFFICE VISIT (OUTPATIENT)
Dept: OBSTETRICS AND GYNECOLOGY | Facility: CLINIC | Age: 37
End: 2022-06-27
Attending: OBSTETRICS & GYNECOLOGY
Payer: COMMERCIAL

## 2022-06-27 VITALS
SYSTOLIC BLOOD PRESSURE: 110 MMHG | DIASTOLIC BLOOD PRESSURE: 72 MMHG | WEIGHT: 165.38 LBS | BODY MASS INDEX: 25.07 KG/M2 | HEIGHT: 68 IN

## 2022-06-27 DIAGNOSIS — Z01.419 ENCOUNTER FOR GYNECOLOGICAL EXAMINATION: ICD-10-CM

## 2022-06-27 DIAGNOSIS — Z11.51 SCREENING FOR HUMAN PAPILLOMAVIRUS: ICD-10-CM

## 2022-06-27 DIAGNOSIS — Z12.4 SCREENING FOR CERVICAL CANCER: Primary | ICD-10-CM

## 2022-06-27 PROCEDURE — 99999 PR PBB SHADOW E&M-EST. PATIENT-LVL III: CPT | Mod: PBBFAC,,, | Performed by: OBSTETRICS & GYNECOLOGY

## 2022-06-27 PROCEDURE — 99395 PR PREVENTIVE VISIT,EST,18-39: ICD-10-PCS | Mod: S$GLB,,, | Performed by: OBSTETRICS & GYNECOLOGY

## 2022-06-27 PROCEDURE — 99999 PR PBB SHADOW E&M-EST. PATIENT-LVL III: ICD-10-PCS | Mod: PBBFAC,,, | Performed by: OBSTETRICS & GYNECOLOGY

## 2022-06-27 PROCEDURE — 99395 PREV VISIT EST AGE 18-39: CPT | Mod: S$GLB,,, | Performed by: OBSTETRICS & GYNECOLOGY

## 2022-06-27 PROCEDURE — 88175 CYTOPATH C/V AUTO FLUID REDO: CPT | Performed by: OBSTETRICS & GYNECOLOGY

## 2022-06-27 PROCEDURE — 87624 HPV HI-RISK TYP POOLED RSLT: CPT | Performed by: OBSTETRICS & GYNECOLOGY

## 2022-06-27 NOTE — PROGRESS NOTES
Subjective:       Patient ID: Astrid Kaemmerling is a 37 y.o. female.    Chief Complaint:  Well Woman (Pap/hpv 5--19, Neg  )      History of Present Illness.  Astrid Kaemmerling is a 37 y.o. female.  She has no breast or urinary symptoms.  She has no menorrhagia, oligomenorrhea, bleeding betweeen menses, postcoital bleeding, dysmenorrhea, pelvic pain, dyspareunia, vaginal dryness, vaginal discharge, or sexual complaints.  She is currently using Kyleena for contraception.    GYN & OB History  No LMP recorded. Patient has had an implant.   Last Pap: 2019 Negative    OB History    Para Term  AB Living   1 1 1 0 0 1   SAB IAB Ectopic Multiple Live Births   0 0 0 0 1      # Outcome Date GA Lbr Urbano/2nd Weight Sex Delivery Anes PTL Lv   1 Term 20 39w0d  3.97 kg (8 lb 12 oz) M CS-LTranv Spinal  MASTER      Obstetric Comments   Menarche at 12       Past Medical History:   Diagnosis Date    Asthma     Counseling for HPV (human papillomavirus) vaccination 2019    Not received (By the time [facility] received the Hpv Vaccines, she was too old)     IUD (intrauterine device) in place 2021    Kyleena      Past Surgical History:   Procedure Laterality Date     SECTION N/A 2020    Procedure:  SECTION;  Surgeon: Akosua Gandhi MD;  Location: [facility] L&[facility];  Service: OB/GYN;  Laterality: N/A;     SECTION  2020    Boy     FOOT SURGERY Right 1998     Family History   Problem Relation Age of Onset    Colon cancer Paternal Grandfather     Prostate cancer Paternal Grandfather         30's    Breast cancer Maternal Grandmother         70's    Stroke Maternal Grandmother     Heart attack Maternal Grandfather     Parkinsonism Paternal Grandmother     No Known Problems Father     No Known Problems Mother     No Known Problems Brother     No Known Problems Brother     Ovarian cancer Neg Hx      Social History     Tobacco Use    Smoking status: Never Smoker    Smokeless  "tobacco: Never Used   Substance Use Topics    Alcohol use: Yes     Comment: once a week    Drug use: Never       Current Outpatient Medications:     FLOVENT  mcg/actuation inhaler, Inhale 1 puff into the lungs 2 (two) times daily., Disp: 36 g, Rfl: 3    vitamin D (VITAMIN D3) 1000 units Tab, Take 1,000 Units by mouth once daily., Disp: , Rfl:     Current Facility-Administered Medications:     levonorgestreL (KYLEENA) 17.5 mcg/24 hrs (5 yrs) 19.5 mg IUD 17.5 mcg, 17.5 mcg, Intrauterine, , Akousa Gandhi MD, 17.5 mcg at 08/17/21 1000    Review of patient's allergies indicates:   Allergen Reactions    Celery (apium graveolens) (umbelliferae) Shortness Of Breath    Apple Other (See Comments)     Asthma symptoms     Banana     Nuts [tree nut]        Review of Systems  Review of Systems   Constitutional: Negative for fatigue.   HENT: Negative for trouble swallowing.    Eyes: Negative for visual disturbance.   Respiratory: Negative for cough and shortness of breath.    Cardiovascular: Negative for chest pain.   Gastrointestinal: Negative for abdominal distention, abdominal pain, blood in stool, nausea and vomiting.   Genitourinary: Negative for difficulty urinating, dyspareunia, dysuria, flank pain, frequency, hematuria, pelvic pain, urgency, vaginal bleeding, vaginal discharge and vaginal pain.   Musculoskeletal: Negative for arthralgias.   Skin: Negative for rash.   Neurological: Negative for dizziness and headaches.   Psychiatric/Behavioral: Negative for sleep disturbance. The patient is not nervous/anxious.         Objective:     Vitals:    06/27/22 1340   BP: 110/72   Weight: 75 kg (165 lb 5.5 oz)   Height: 5' 8" (1.727 m)   PainSc: 0-No pain     Body mass index is 25.14 kg/m².      Physical Exam:   Constitutional: She is oriented to person, place, and time. Vital signs are normal. She appears well-developed and well-nourished.    HENT:   Head: Normocephalic.     Neck: No thyromegaly present.   "   Pulmonary/Chest: Right breast exhibits no mass, no nipple discharge, no skin change, no tenderness and no swelling. Left breast exhibits no mass, no nipple discharge, no skin change, no tenderness and no swelling. Breasts are symmetrical.        Abdominal: Soft. Bowel sounds are normal. She exhibits no distension. There is no abdominal tenderness.     Genitourinary:    Vagina and uterus normal.      Pelvic exam was performed with patient supine.   There is no rash, tenderness, lesion or injury on the right labia. There is no rash, tenderness, lesion or injury on the left labia. Cervix is normal. Right adnexum displays no mass, no tenderness and no fullness. Left adnexum displays no mass, no tenderness and no fullness. No erythema or  no vaginal discharge in the vagina. Cervix exhibits no motion tenderness and no discharge. IUD strings visualized.          Musculoskeletal: Normal range of motion.      Lymphadenopathy:        Right: No supraclavicular adenopathy present.        Left: No supraclavicular adenopathy present.    Neurological: She is alert and oriented to person, place, and time.    Skin: Skin is warm and dry.    Psychiatric: She has a normal mood and affect.        Assessment/ Plan:     Screening for cervical cancer  -     Liquid-Based Pap Smear, Screening    Screening for human papillomavirus  -     HPV High Risk Genotypes, PCR    Encounter for gynecological examination        Routine pap smears.  Self breast exam  Gardasil discussed.  Contraception reviewed/discussed.  Follow-up with me in 1 year.

## 2022-07-05 LAB
CLINICAL INFO: NORMAL
CYTO CVX: NORMAL
CYTOLOGIST CVX/VAG CYTO: NORMAL
CYTOLOGIST CVX/VAG CYTO: NORMAL
CYTOLOGY CMNT CVX/VAG CYTO-IMP: NORMAL
CYTOLOGY PAP THIN PREP EXPLANATION: NORMAL
DATE OF PREVIOUS PAP: NO
DATE PREVIOUS BX: NO
GEN CATEG CVX/VAG CYTO-IMP: NORMAL
HPV I/H RISK 4 DNA CVX QL NAA+PROBE: NOT DETECTED
LMP START DATE: NORMAL
MICROORGANISM CVX/VAG CYTO: NORMAL
PATHOLOGIST CVX/VAG CYTO: NORMAL
SERVICE CMNT-IMP: NORMAL
SPECIMEN SOURCE CVX/VAG CYTO: NORMAL
STAT OF ADQ CVX/VAG CYTO-IMP: NORMAL

## 2023-05-16 ENCOUNTER — PATIENT OUTREACH (OUTPATIENT)
Dept: ADMINISTRATIVE | Facility: HOSPITAL | Age: 38
End: 2023-05-16
Payer: COMMERCIAL

## 2023-05-16 ENCOUNTER — PATIENT MESSAGE (OUTPATIENT)
Dept: ADMINISTRATIVE | Facility: HOSPITAL | Age: 38
End: 2023-05-16
Payer: COMMERCIAL

## 2023-05-18 DIAGNOSIS — Z12.31 OTHER SCREENING MAMMOGRAM: ICD-10-CM

## 2023-11-15 ENCOUNTER — PATIENT MESSAGE (OUTPATIENT)
Dept: PRIMARY CARE CLINIC | Facility: CLINIC | Age: 38
End: 2023-11-15
Payer: COMMERCIAL